# Patient Record
Sex: FEMALE | Race: WHITE | Employment: FULL TIME | URBAN - METROPOLITAN AREA
[De-identification: names, ages, dates, MRNs, and addresses within clinical notes are randomized per-mention and may not be internally consistent; named-entity substitution may affect disease eponyms.]

---

## 2019-03-19 ENCOUNTER — TELEPHONE (OUTPATIENT)
Dept: RHEUMATOLOGY | Facility: CLINIC | Age: 59
End: 2019-03-19

## 2019-03-19 ENCOUNTER — OFFICE VISIT (OUTPATIENT)
Dept: RHEUMATOLOGY | Facility: CLINIC | Age: 59
End: 2019-03-19
Payer: COMMERCIAL

## 2019-03-19 VITALS
SYSTOLIC BLOOD PRESSURE: 123 MMHG | BODY MASS INDEX: 21.05 KG/M2 | HEIGHT: 66 IN | HEART RATE: 72 BPM | WEIGHT: 131 LBS | DIASTOLIC BLOOD PRESSURE: 86 MMHG

## 2019-03-19 DIAGNOSIS — M25.50 POLYARTHRALGIA: Primary | ICD-10-CM

## 2019-03-19 DIAGNOSIS — M81.8 OTHER OSTEOPOROSIS, UNSPECIFIED PATHOLOGICAL FRACTURE PRESENCE: ICD-10-CM

## 2019-03-19 DIAGNOSIS — M79.7 FIBROMYALGIA: ICD-10-CM

## 2019-03-19 DIAGNOSIS — M54.50 LOW BACK PAIN WITHOUT SCIATICA, UNSPECIFIED BACK PAIN LATERALITY, UNSPECIFIED CHRONICITY: ICD-10-CM

## 2019-03-19 DIAGNOSIS — E55.9 VITAMIN D DEFICIENCY: ICD-10-CM

## 2019-03-19 PROCEDURE — 99212 OFFICE O/P EST SF 10 MIN: CPT | Performed by: INTERNAL MEDICINE

## 2019-03-19 PROCEDURE — 99244 OFF/OP CNSLTJ NEW/EST MOD 40: CPT | Performed by: INTERNAL MEDICINE

## 2019-03-19 RX ORDER — OMEPRAZOLE 20 MG/1
40 CAPSULE, DELAYED RELEASE ORAL ONCE
Refills: 0 | COMMUNITY
Start: 2019-02-11

## 2019-03-19 RX ORDER — ERGOCALCIFEROL (VITAMIN D2) 1250 MCG
50000 CAPSULE ORAL WEEKLY
Qty: 12 CAPSULE | Refills: 0 | Status: SHIPPED | OUTPATIENT
Start: 2019-03-19 | End: 2019-04-18

## 2019-03-19 NOTE — TELEPHONE ENCOUNTER
Pharmacy requesting 90 day refill for following. Original qty- 12  Qty requested- 13      Current Outpatient Medications:              ergocalciferol 02902 units Oral Cap Take 1 capsule (50,000 Units total) by mouth once a week.  Disp: 12 capsule Rfl: 0

## 2019-03-19 NOTE — PROGRESS NOTES
Everett Basilio is a 60year old female who presents for Patient presents with:  Joint Pain: hand,foot and back pain  Muscle Pain  . HPI:     I had the pleasure of seeing Everett Basilio on 3/19/2019 for evaluation. She was referred by Dr. Clark Rodriguez.        She is 10 years ago - after falling on it. She broke her right  arm in the [de-identified]. Running up the stairs and hitting it on a wall. Getting triggger fingers   She gets neck, shoulder and lower back pain. She can't sleep bc of back pain.      She has never gone no temple pain  Eyes: No visual changes,   CVS: No chest pain, no heart disease  RS: No SOB, no Cough, No Pleurtic pain,   GI: No nausea, no vomiiting, no abominal pain,  hx of ulcer,  Gastritis, , no dyshpagia, no BRBPR or melena  : no dysuria, no hx of %      0 - 3 %      Neutrophils Absolute      1.4 - 7.0 x10E3/uL      Lymphocytes Absolute      0.7 - 3.1 x10E3/uL      Monocytes Absolute      0.1 - 0.9 x10E3/uL      Eosinophils Absolute      0.0 - 0.4 x10E3/uL      Basophils Absolute      0.0 - 0.2 x10E 10 units/mL 7 2    SEDIMENTATION RATE-WESTERGREN      0 - 40 mm/hr      C-REACTIVE PROTEIN, QUANT      0.0 - 4.9 mg/L      VITAMIN D, 25-HYDROXY      30.0 - 100.0 ng/mL      TSH      0.450 - 4.500 uIU/mL      CCP ANTIBODIES IGG/IGA      0 - 19 units      A Globulin, Total      2.0 - 4.5 g/dL 3.4   A/G Ratio      0.7 - 2.0 1.3   Immunofixation Result, Serum       Comment   PLEASE NOTE       Comment   Anti-ssDNA      0 - 99 units/mL    Anti-dsDNA      0 - 40 IU/mL    Anti-Sm      0 - 89 units/mL    Anti-SmRN mild  reversal of the normal lordotic contour of the cervical spine, centered at C4-C5. No evidence of  acute fracture or prevertebral soft tissue swelling. There is minimal anterolisthesis of C4 on C5,  measuring 1 to 2 mm.  There is moderate narrowing of osteopenic. There is no evidence of acute fracture,  dislocation or subluxation. Hallux valgus is noted, with bunion deformity. There is mild asymmetric  narrowing of the first MTP joint, with mild degenerative osteophyte formation.  No definite osseous  er

## 2019-03-19 NOTE — PATIENT INSTRUCTIONS
1. Check labs  2. Check xrays of hands and lower back   3. Try dicllofenac gel 1 % topical   4. Return to clinic in 2 -3 weeks. 5. Check DEXA for osteoporosis   6.  Try tylenol extra strength  3000mg right now - - or arthritis - 650mg   maxium is 4000mg a

## 2019-03-20 NOTE — TELEPHONE ENCOUNTER
12 capsules (Take 1 capsule (50,000 Units total) by mouth once a week) is a 90 day or 3 month supply, no further action required.

## 2019-04-05 ENCOUNTER — TELEPHONE (OUTPATIENT)
Dept: RHEUMATOLOGY | Facility: CLINIC | Age: 59
End: 2019-04-05

## 2019-04-15 ENCOUNTER — HOSPITAL (OUTPATIENT)
Dept: OTHER | Age: 59
End: 2019-04-15

## 2019-04-15 ENCOUNTER — HOSPITAL (OUTPATIENT)
Dept: OTHER | Age: 59
End: 2019-04-15
Attending: HOSPITALIST

## 2019-04-15 LAB
ANALYZER ANC (IANC): ABNORMAL
ANION GAP SERPL CALC-SCNC: 16 MMOL/L (ref 10–20)
BASOPHILS # BLD: 0.1 THOUSAND/MCL (ref 0–0.3)
BASOPHILS NFR BLD: 0 %
BUN SERPL-MCNC: 12 MG/DL (ref 6–20)
BUN/CREAT SERPL: 26 (ref 7–25)
CALCIUM SERPL-MCNC: 8.7 MG/DL (ref 8.4–10.2)
CHLORIDE: 99 MMOL/L (ref 98–107)
CO2 SERPL-SCNC: 24 MMOL/L (ref 21–32)
CREAT SERPL-MCNC: 0.46 MG/DL (ref 0.51–0.95)
DIFFERENTIAL METHOD BLD: ABNORMAL
EOSINOPHIL # BLD: 0 THOUSAND/MCL (ref 0.1–0.5)
EOSINOPHIL NFR BLD: 0 %
ERYTHROCYTE [DISTWIDTH] IN BLOOD: 11.9 % (ref 11–15)
GLUCOSE SERPL-MCNC: 100 MG/DL (ref 65–99)
HEMATOCRIT: 38.7 % (ref 36–46.5)
HGB BLD-MCNC: 12.8 GM/DL (ref 12–15.5)
IMM GRANULOCYTES # BLD AUTO: 0 THOUSAND/MCL (ref 0–0.2)
IMM GRANULOCYTES NFR BLD: 0 %
LYMPHOCYTES # BLD: 1.7 THOUSAND/MCL (ref 1–4)
LYMPHOCYTES NFR BLD: 13 %
MCH RBC QN AUTO: 31.3 PG (ref 26–34)
MCHC RBC AUTO-ENTMCNC: 33.1 GM/DL (ref 32–36.5)
MCV RBC AUTO: 94.6 FL (ref 78–100)
MONOCYTES # BLD: 0.4 THOUSAND/MCL (ref 0.3–0.9)
MONOCYTES NFR BLD: 3 %
NEUTROPHILS # BLD: 10.7 THOUSAND/MCL (ref 1.8–7.7)
NEUTROPHILS NFR BLD: 84 %
NEUTS SEG NFR BLD: ABNORMAL %
NRBC (NRBCRE): 0 /100 WBC
PLATELET # BLD: 566 THOUSAND/MCL (ref 140–450)
POTASSIUM SERPL-SCNC: 4 MMOL/L (ref 3.4–5.1)
RBC # BLD: 4.09 MILLION/MCL (ref 4–5.2)
SODIUM SERPL-SCNC: 135 MMOL/L (ref 135–145)
WBC # BLD: 12.8 THOUSAND/MCL (ref 4.2–11)

## 2019-04-17 ENCOUNTER — DIAGNOSTIC TRANS (OUTPATIENT)
Dept: OTHER | Age: 59
End: 2019-04-17

## 2019-04-17 ENCOUNTER — HOSPITAL (OUTPATIENT)
Dept: OTHER | Age: 59
End: 2019-04-17

## 2019-04-18 LAB
ANALYZER ANC (IANC): ABNORMAL
ANION GAP SERPL CALC-SCNC: 15 MMOL/L (ref 10–20)
BASOPHILS # BLD: 0 THOUSAND/MCL (ref 0–0.3)
BASOPHILS NFR BLD: 0 %
BUN SERPL-MCNC: 12 MG/DL (ref 6–20)
BUN/CREAT SERPL: 26 (ref 7–25)
CALCIUM SERPL-MCNC: 8.7 MG/DL (ref 8.4–10.2)
CHLORIDE: 101 MMOL/L (ref 98–107)
CO2 SERPL-SCNC: 24 MMOL/L (ref 21–32)
CREAT SERPL-MCNC: 0.47 MG/DL (ref 0.51–0.95)
DIFFERENTIAL METHOD BLD: ABNORMAL
EOSINOPHIL # BLD: 0 THOUSAND/MCL (ref 0.1–0.5)
EOSINOPHIL NFR BLD: 0 %
ERYTHROCYTE [DISTWIDTH] IN BLOOD: 11.8 % (ref 11–15)
GLUCOSE SERPL-MCNC: 115 MG/DL (ref 65–99)
HEMATOCRIT: 36.6 % (ref 36–46.5)
HGB BLD-MCNC: 12.1 GM/DL (ref 12–15.5)
IMM GRANULOCYTES # BLD AUTO: 0 THOUSAND/MCL (ref 0–0.2)
IMM GRANULOCYTES NFR BLD: 0 %
LYMPHOCYTES # BLD: 0.7 THOUSAND/MCL (ref 1–4)
LYMPHOCYTES NFR BLD: 10 %
MCH RBC QN AUTO: 31.2 PG (ref 26–34)
MCHC RBC AUTO-ENTMCNC: 33.1 GM/DL (ref 32–36.5)
MCV RBC AUTO: 94.3 FL (ref 78–100)
MONOCYTES # BLD: 0.3 THOUSAND/MCL (ref 0.3–0.9)
MONOCYTES NFR BLD: 4 %
NEUTROPHILS # BLD: 6.1 THOUSAND/MCL (ref 1.8–7.7)
NEUTROPHILS NFR BLD: 86 %
NEUTS SEG NFR BLD: ABNORMAL %
NRBC (NRBCRE): 0 /100 WBC
PLATELET # BLD: 475 THOUSAND/MCL (ref 140–450)
POTASSIUM SERPL-SCNC: 3.6 MMOL/L (ref 3.4–5.1)
RBC # BLD: 3.88 MILLION/MCL (ref 4–5.2)
SODIUM SERPL-SCNC: 136 MMOL/L (ref 135–145)
WBC # BLD: 7.1 THOUSAND/MCL (ref 4.2–11)

## 2019-05-16 PROBLEM — S82.002A LEFT PATELLA FRACTURE: Status: ACTIVE | Noted: 2019-05-16

## 2019-06-05 ENCOUNTER — TELEPHONE (OUTPATIENT)
Dept: RHEUMATOLOGY | Facility: CLINIC | Age: 59
End: 2019-06-05

## 2019-06-05 DIAGNOSIS — E55.9 VITAMIN D DEFICIENCY: Primary | ICD-10-CM

## 2019-06-05 RX ORDER — MULTIVIT-MIN/IRON/FOLIC ACID/K 18-600-40
2000 CAPSULE ORAL DAILY
Qty: 30 CAPSULE | Refills: 3 | Status: SHIPPED | OUTPATIENT
Start: 2019-06-05 | End: 2019-07-05

## 2019-06-05 NOTE — TELEPHONE ENCOUNTER
LOV:3-19  Last Filled:3-19, #12 with 0 refill  Labs:4-4, Vitamin D 17  Future Appointments   Date Time Provider Janice Cross   6/7/2019  8:00 AM Velma Zapien MD [facility]   6/27/2019 10:00 AM Rowan Magallanes PA-C [facility] [address] [facility]

## 2019-06-05 NOTE — TELEPHONE ENCOUNTER
Sent in cholecalciferol 2000units daily - she should repeat her vit d in 3 months - will put order in for quest - please mail her the order

## 2019-06-10 ENCOUNTER — TELEPHONE (OUTPATIENT)
Dept: RHEUMATOLOGY | Facility: CLINIC | Age: 59
End: 2019-06-10

## 2019-06-10 NOTE — TELEPHONE ENCOUNTER
Received a refill request for Vitamin D 50,000 IU . On 6-5, 2000 IU was sent. Phoned Kristan and spoke to [facility] disregard request he received the new order for 2000 units.

## 2019-06-21 PROBLEM — S82.042D CLOSED DISPLACED COMMINUTED FRACTURE OF LEFT PATELLA WITH ROUTINE HEALING, SUBSEQUENT ENCOUNTER: Status: ACTIVE | Noted: 2019-06-21

## 2019-07-18 ENCOUNTER — OFFICE VISIT (OUTPATIENT)
Dept: RHEUMATOLOGY | Facility: CLINIC | Age: 59
End: 2019-07-18
Payer: COMMERCIAL

## 2019-07-18 ENCOUNTER — HOSPITAL ENCOUNTER (OUTPATIENT)
Dept: GENERAL RADIOLOGY | Age: 59
Discharge: HOME | End: 2019-07-18
Attending: INTERNAL MEDICINE
Payer: COMMERCIAL

## 2019-07-18 VITALS
BODY MASS INDEX: 21.21 KG/M2 | WEIGHT: 132 LBS | HEIGHT: 66 IN | DIASTOLIC BLOOD PRESSURE: 77 MMHG | HEART RATE: 77 BPM | SYSTOLIC BLOOD PRESSURE: 133 MMHG

## 2019-07-18 DIAGNOSIS — M25.571 ACUTE RIGHT ANKLE PAIN: ICD-10-CM

## 2019-07-18 DIAGNOSIS — M25.571 ACUTE RIGHT ANKLE PAIN: Primary | ICD-10-CM

## 2019-07-18 PROCEDURE — 73610 X-RAY EXAM OF ANKLE: CPT | Performed by: INTERNAL MEDICINE

## 2019-07-18 PROCEDURE — 99214 OFFICE O/P EST MOD 30 MIN: CPT | Performed by: INTERNAL MEDICINE

## 2019-07-18 RX ORDER — NAPROXEN SODIUM 220 MG
TABLET ORAL
COMMUNITY
End: 2019-12-04

## 2019-07-18 NOTE — PATIENT INSTRUCTIONS
1. Check labs  2. Check xray of right ankle . 3. Try dicllofenac gel 1 % topical   4. Get  DEXA for osteoporosis   5. Return to clinic in 2 weeks.

## 2019-07-18 NOTE — PROGRESS NOTES
Aris Mendes is a 60year old female who presents for Patient presents with: Ankle Pain: right  . HPI:     I had the pleasure of seeing Aris Mendes on 3/19/2019 for evaluation. She was referred by Dr. Carly Sanchez.        She is a pleasant 60year old who has join She broke her right  arm in the [de-identified]. Running up the stairs and hitting it on a wall. Getting triggger fingers   She gets neck, shoulder and lower back pain. She can't sleep bc of back pain. She has never gone to phsyical therpay.  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       REVIEW OF SYSTEMS:   Review Of Systems:  Fatigue  Constitutional:No fever, no change in weight tendneress   No 1-5th mtps tendneress   No tophi noted.    EXTREMITIES: no cyanosis, clubbing or edema  NEURO: intact touch, 5/5 ue and le strength    Component      Latest Ref Rng & Units 2/3/2014 2/3/2014 2/3/2014          12:27 PM 12:27 PM 12:27 PM   WBC Anti-Chromatin      0 - 99 units/mL   0   ALBUMIN, SERUM      3.5 - 5.5 g/dL      Total Bilirubin      0.0 - 1.2 mg/dL      Bilirubin, Direct      0.00 - 0.40 mg/dL      Alkaline Phosphatase      39 - 117 IU/L      AST (SGOT)      0 - 40 IU/L      ALT (S x10E3/uL 2.6   Monocytes Absolute      0.1 - 0.9 x10E3/uL 0.3   Eosinophils Absolute      0.0 - 0.4 x10E3/uL 0.1   Basophils Absolute      0.0 - 0.2 x10E3/uL 0.1   IMMATURE GRANULOCYTES      0 - 2 % 0   IMMATURE GRANS (ABS)      0.0 - 0.1 x10E3/uL 0.0   Im PROTEIN, QUANT      0.0 - 4.9 mg/L 2.0   VITAMIN D, 25-HYDROXY      30.0 - 100.0 ng/mL 14.4 (L)   TSH      0.450 - 4.500 uIU/mL 1.560   CCP ANTIBODIES IGG/IGA      0 - 19 units 2   ANTINUCLEAR ANTIBODIES(BERYL)       Negative   HLA-B27       Negative   Creat Magnesium, Serum      1.5 - 2.5 mg/dL 2.3   CREATINE KINASE, TOTAL      29 - 143 U/L 58   VITAMIN D, 25-OH, TOTAL      30 - 100 ng/mL 17 (L)     2/3/2014 - c spine xray, b/l hand xray and b/l foot xray   Cervical spine: The cervical spine is visualized t is no evidence of a displaced fracture. The bilateral hip joint spaces are preserved, and no  erosive or proliferative arthritic changes are seen at either hip. Limited evaluation of the  bilateral sacroiliac joints demonstrates no gross abnormality.      R AND PLAN:   Bernadine Valdes is a 60year old female who presents for Patient presents with: Ankle Pain: right      1.  New onset acute  right ankle pain and swleling <2 weeks - with hx of  osteoarthirits and fibrmoyalgia   - work up in 2014 was negative for in

## 2019-07-20 LAB
ANA SCREEN, IFA: POSITIVE
C-REACTIVE PROTEIN: 10 MG/L
CYCLIC CITRULLINATED$PEPTIDE (CCP) AB (IGG): <16 UNITS
RHEUMATOID FACTOR: <14 IU/ML
SED RATE BY MODIFIED$WESTERGREN: 33 MM/H
URIC ACID: 4.2 MG/DL (ref 2.5–7)

## 2019-07-22 ENCOUNTER — TELEPHONE (OUTPATIENT)
Dept: RHEUMATOLOGY | Facility: CLINIC | Age: 59
End: 2019-07-22

## 2019-07-22 RX ORDER — MELOXICAM 7.5 MG/1
7.5 TABLET ORAL DAILY
Qty: 30 TABLET | Refills: 0 | Status: SHIPPED | OUTPATIENT
Start: 2019-07-22 | End: 2020-11-09

## 2019-07-22 NOTE — TELEPHONE ENCOUNTER
Pt reports worsening R ankle pain and swelling. Pt rates pain at 8/10. She is having difficulty walking and experiences numbness/tingling to 2nd and 3rd toes w/ walking. Pt reports toes are pink in color and she is able to wiggle them.      Pt has tried [employer]

## 2019-07-22 NOTE — TELEPHONE ENCOUNTER
Does she want to try a stronger anti inflammatory than aleve - like meloxicam?  - she can take it with omeprazole to prevent gastririts

## 2019-07-22 NOTE — TELEPHONE ENCOUNTER
Contacted pt and reviewed Dr. Cleve Klein message below. Pt would like to try meloxicam. 30-day script for meloxicam 7.5mg/day pended if agreeable. Please advise.

## 2019-08-02 ENCOUNTER — HOSPITAL ENCOUNTER (OUTPATIENT)
Dept: BONE DENSITY | Facility: HOSPITAL | Age: 59
Discharge: HOME | End: 2019-08-02
Attending: INTERNAL MEDICINE
Payer: COMMERCIAL

## 2019-08-02 DIAGNOSIS — M81.8 OTHER OSTEOPOROSIS, UNSPECIFIED PATHOLOGICAL FRACTURE PRESENCE: ICD-10-CM

## 2019-08-02 PROCEDURE — 77080 DXA BONE DENSITY AXIAL: CPT | Performed by: INTERNAL MEDICINE

## 2019-08-06 ENCOUNTER — OFFICE VISIT (OUTPATIENT)
Dept: RHEUMATOLOGY | Facility: CLINIC | Age: 59
End: 2019-08-06
Payer: COMMERCIAL

## 2019-08-06 ENCOUNTER — TELEPHONE (OUTPATIENT)
Dept: RHEUMATOLOGY | Facility: CLINIC | Age: 59
End: 2019-08-06

## 2019-08-06 VITALS
SYSTOLIC BLOOD PRESSURE: 105 MMHG | WEIGHT: 132 LBS | BODY MASS INDEX: 21.21 KG/M2 | HEART RATE: 77 BPM | DIASTOLIC BLOOD PRESSURE: 69 MMHG | HEIGHT: 66 IN

## 2019-08-06 DIAGNOSIS — M81.0 OSTEOPOROSIS, UNSPECIFIED OSTEOPOROSIS TYPE, UNSPECIFIED PATHOLOGICAL FRACTURE PRESENCE: Primary | ICD-10-CM

## 2019-08-06 DIAGNOSIS — M25.50 POLYARTHRALGIA: ICD-10-CM

## 2019-08-06 PROCEDURE — 99214 OFFICE O/P EST MOD 30 MIN: CPT | Performed by: INTERNAL MEDICINE

## 2019-08-06 NOTE — PATIENT INSTRUCTIONS
1. Try prolia -   2. Check labs prior to prolia -   3. Return to clinic in 12 months. - will repeat DEXA at that time. Denosumab injection  Brand Names: Kirill Cooper  What is this medicine? DENOSUMAB (den oh shayy mab) slows bone breakdown.  Prolia is use effects that usually do not require medical attention (report to your doctor or health care professional if they continue or are bothersome):  · constipation  · diarrhea  · headache  · joint pain  · loss of appetite  · muscle pain  · runny nose  · tirednes enough calcium and vitamin D while you are taking this medicine, unless your doctor tells you not to. Discuss the foods you eat and the vitamins you take with your health care professional.  See your dentist regularly.  Brush and floss your teeth as directe

## 2019-08-06 NOTE — PROGRESS NOTES
Tanna Coleman is a 60year old female who presents for Patient presents with: Follow - Up: Labs,xray, dexa discussion  . HPI:     I had the pleasure of seeing Tanna Coleman on 3/19/2019 for evaluation. She was referred by Dr. Kathia Borrego.        She is a pleasan ago - after falling on it. She broke her right  arm in the [de-identified]. Running up the stairs and hitting it on a wall. Getting triggger fingers   She gets neck, shoulder and lower back pain. She can't sleep bc of back pain.      [facility]        Family History   Problem Relation Age of Onset   • Heart Disorder Father    • Cancer Father         prostate   • Other (Other) Mother         RA    1 brother - prostate cancer passed 3 years ago  -    Social History:  Social History and bouchards notdes in right 2-5th pips -   Squaring of thumbs and wrists   B/l thumb tender in dips -   No synvoitsi seen   Tender in elbows, and shoulders   tneder in neck   Tender in low back - SLR slightly positive onn left side   Tender in 1st mtps - 0.7 - 2.0      Immunofixation Result, Serum            PLEASE NOTE            Anti-ssDNA      0 - 99 units/mL   30   Anti-dsDNA      0 - 40 IU/mL   2   Anti-Sm      0 - 89 units/mL   5   Anti-SmRNP      0 - 83 units/mL   18   Anti-SS-A (Ro)      0 - 91 uni 93   MCH      26.6 - 33.0 pg 31.3   MCHC      31.5 - 35.7 g/dL 33.6   RDW      12.3 - 15.4 % 12.9   Platelet Count      709 - 379 x10E3/uL 355   Neutrophils %      40 - 74 % 55   Lymphocytes %      14 - 46 % 37   Monocytes %      4 - 12 % 5   Eosinophils % AM      >59 mL/min/1.73 118   HEP A AB, IGM      Negative Negative   HBSAg Screen      Negative Negative   HEP B CORE AB, IGM      Negative Negative   HEP C VIRUS AB      0.0 - 0.9 s/co ratio 0.1   RF IgM      0 - 25 IU/mL    RF IgG      0 - 20 units/mL Hematocrit      35.0 - 45.0 % 36.6   MCV      80.0 - 100.0 fL 92.7   MCH      27.0 - 33.0 pg 31.1   MCHC      32.0 - 36.0 g/dL 33.6   RDW      11.0 - 15.0 % 11.7   Platelet Count      125 - 400 Thousand/uL 484 (H)   MPV      7.5 - 12.5 fL 9.6   C-REACTIV joints addition, there  are central erosions at the second and fifth DIP joints. The MCP joints and wrist joints are  relatively preserved. A nonspecific subcortical cyst is seen within the lunate.      Left hand: Bony alignment is anatomic, and there is no joints. No evidence of arthritic changes in the pelvis/hips. Mild degenerative arthritis at the first MTP joints of the bilateral feet, with bilateral hallux  valgus/bunion deformities.     7/12/2019 - right wrist xray    X-rays of the right wrist d cymbalta,   She tried gabpnetin, flexeril, tylenol #3 and tramadol in the past.   - diclofeanc gel 1 %  On aleve - - no gastirits and not helping.        2. osteoporosis - check DEXA scan - she is due - communited fracture of right wrist in 4/2019 - and lef

## 2019-08-08 ENCOUNTER — TELEPHONE (OUTPATIENT)
Dept: RHEUMATOLOGY | Facility: CLINIC | Age: 59
End: 2019-08-08

## 2019-08-08 NOTE — TELEPHONE ENCOUNTER
No PA required for Prolia injection. The provider is in network for this pt's plan. Wheter office visit is billed or not, the patient will be responsible for a $30 co-pay which will cover Prolia, administration, and the office visit.  Co-pays contribute to a

## 2019-08-08 NOTE — TELEPHONE ENCOUNTER
Pt aware of BI information. Pt agreeable and scheduled appointment for 8/14 at 9 am [facility]. She is aware to have labs completed 1-2 days prior to Prolia injection.

## 2019-08-08 NOTE — TELEPHONE ENCOUNTER
Per pt she would like her Order fax to [employer] in [facility] SDR#[phone] Fax# [phone]. Pt is planning to go on 08/12/2019 Monday.

## 2019-08-12 LAB
ALBUMIN/GLOBULIN RATIO: 1.5 (CALC) (ref 1–2.5)
ALBUMIN: 4.3 G/DL (ref 3.6–5.1)
ALKALINE PHOSPHATASE: 106 U/L (ref 33–130)
ALT: 9 U/L (ref 6–29)
AST: 14 U/L (ref 10–35)
BILIRUBIN, TOTAL: 0.4 MG/DL (ref 0.2–1.2)
BUN: 7 MG/DL (ref 7–25)
CALCIUM: 9.8 MG/DL (ref 8.6–10.4)
CARBON DIOXIDE: 27 MMOL/L (ref 20–32)
CHLORIDE: 102 MMOL/L (ref 98–110)
CREATININE: 0.62 MG/DL (ref 0.5–0.99)
EGFR IF AFRICN AM: 114 ML/MIN/1.73M2
EGFR IF NONAFRICN AM: 98 ML/MIN/1.73M2
GLOBULIN: 2.8 G/DL (CALC) (ref 1.9–3.7)
GLUCOSE: 90 MG/DL (ref 65–99)
POTASSIUM: 4.7 MMOL/L (ref 3.5–5.3)
PROTEIN, TOTAL: 7.1 G/DL (ref 6.1–8.1)
SODIUM: 137 MMOL/L (ref 135–146)

## 2019-08-13 LAB — SED RATE BY MODIFIED$WESTERGREN: 28 MM/H

## 2019-08-28 ENCOUNTER — NURSE ONLY (OUTPATIENT)
Dept: RHEUMATOLOGY | Facility: CLINIC | Age: 59
End: 2019-08-28
Payer: COMMERCIAL

## 2019-08-28 DIAGNOSIS — M81.0 POSTMENOPAUSAL OSTEOPOROSIS: Primary | ICD-10-CM

## 2019-08-28 PROCEDURE — 96372 THER/PROPH/DIAG INJ SC/IM: CPT | Performed by: INTERNAL MEDICINE

## 2019-08-28 NOTE — PROGRESS NOTES
Patient came to office for first Prolia injection. Labs reviewed and WNL. Name and  verified. Patient aware she is to receive Prolia injection. Injection given to left upper arm subcutaneously, patient tolerated injection well.  Patient given reminder no

## 2019-12-04 ENCOUNTER — OFFICE VISIT (OUTPATIENT)
Dept: RHEUMATOLOGY | Facility: CLINIC | Age: 59
End: 2019-12-04
Payer: COMMERCIAL

## 2019-12-04 VITALS
BODY MASS INDEX: 21.21 KG/M2 | WEIGHT: 132 LBS | RESPIRATION RATE: 16 BRPM | HEART RATE: 72 BPM | SYSTOLIC BLOOD PRESSURE: 110 MMHG | HEIGHT: 66 IN | DIASTOLIC BLOOD PRESSURE: 73 MMHG

## 2019-12-04 DIAGNOSIS — M81.0 OSTEOPOROSIS, UNSPECIFIED OSTEOPOROSIS TYPE, UNSPECIFIED PATHOLOGICAL FRACTURE PRESENCE: ICD-10-CM

## 2019-12-04 DIAGNOSIS — G89.29 OTHER CHRONIC PAIN: ICD-10-CM

## 2019-12-04 DIAGNOSIS — M15.9 GENERALIZED OSTEOARTHRITIS: Primary | ICD-10-CM

## 2019-12-04 PROCEDURE — 99213 OFFICE O/P EST LOW 20 MIN: CPT | Performed by: INTERNAL MEDICINE

## 2019-12-04 NOTE — PROGRESS NOTES
Taj Johnson is a 60year old female who presents for Patient presents with:  Osteoporosis  Fibromyalgia  Medication Follow-Up  Osteoarthritis: pt c/o left shoulder pain  . HPI:     I had the pleasure of seeing Taj Johnson on 3/19/2019 for evaluation. recently. She broke her right wrist about 10 years ago - after falling on it. She broke her right  arm in the [de-identified]. Running up the stairs and hitting it on a wall. Getting triggger fingers   She gets neck, shoulder and lower back pain.    She can't tablet (7.5 mg total) by mouth daily. 30 tablet 0   • Diclofenac Sodium 1 % Transdermal Gel Apply 2 g topically 4 (four) times daily. 1 Tube 3   • omeprazole 20 MG Oral Capsule Delayed Release 2 (two) times daily.   0   • Acetaminophen (TYLENOL OR) 500 mg a (59.9 kg)   BMI 21.31 kg/m²   HEENT: Clear oropharynx, no oral ulcers, EOM intact, clear sclear, PERRLA, pleasant, no acute distress, no CAD, no neck tendnerness, good ROM,   No rashes  CVS: RRR, no murmurs  RS: CTAB, no crackles, no rhonchi  ABD: Soft Non Immunoglobulin A, Qn, Serum      91 - 414 mg/dL      Immunoglobulin M, Qn, Serum      40 - 230 mg/dL      Total Protein      6.0 - 8.5 g/dL      Albumin      3.2 - 5.6 g/dL      ALPHA-1-GLOBULIN      0.1 - 0.4 g/dL      ALPHA-2-GLOBULIN      0.4 - 1.2 Adult      Complement C4, Serum      9 - 36 mg/dL Adult      ACE      14 - 82 U/L      PTH, INTACT      15 - 65 pg/mL      CALCIUM      8.7 - 10.2 mg/dL      G-6-PD, QUANT      146 - 376 U/10E12 RBC        Component      Latest Ref Rng & Units 2/3/2014 Anti-Scl-70      0 - 32 units/mL    Anti-Chromatin      0 - 99 units/mL    ALBUMIN, SERUM      3.5 - 5.5 g/dL 4.6   Total Bilirubin      0.0 - 1.2 mg/dL 0.4   Bilirubin, Direct      0.00 - 0.40 mg/dL 0.10   Alkaline Phosphatase      39 - 117 IU/L 84   AS 7.0   Albumin      3.6 - 5.1 g/dL 4.1   Globulin, Total      1.9 - 3.7 g/dL (calc) 2.9   ALBUMIN/GLOBULIN RATIO      1.0 - 2.5 (calc) 1.4   Total Bilirubin      0.2 - 1.2 mg/dL 0.4   Alkaline Phosphatase      33 - 130 U/L 107   AST      10 - 35 U/L 17   AL each of these levels as well. No bridging syndesmophytes are identified. On oblique  views, there is no bony foraminal stenosis at any cervical level.      Right hand: Bony alignment is anatomic, and there is no evidence of acute fracture, dislocation or  s with bunion deformity. There is mild asymmetric narrowing of  the first MTP joint, with mild degenerative osteophyte formation.  No definite osseous erosions are  identified.      =====  IMPRESSION:     Degenerative changes at multiple levels of the cervica Osteoarthritis  OP  L shoulder pain  BLE pain    - Blood work in the past showed elevated ESR and CRP but it is now normal.  Also blood work for inflammatory arthritis was negative  - Evidence of Valerie's and Heberden nodes consistent with OA  - Cannot t

## 2019-12-04 NOTE — PATIENT INSTRUCTIONS
You were seen today for ostoearthritis  Cont tylneol as needed  Can recommend pain management if needed  Consider getting prolia in feb  See pain management

## 2020-02-13 ENCOUNTER — TELEPHONE (OUTPATIENT)
Dept: RHEUMATOLOGY | Facility: CLINIC | Age: 60
End: 2020-02-13

## 2020-02-13 DIAGNOSIS — M81.0 POSTMENOPAUSAL OSTEOPOROSIS: Primary | ICD-10-CM

## 2020-02-13 NOTE — TELEPHONE ENCOUNTER
Pt is due for Prolia after 2/28/2020. BI started on [employer] portal.     \"An Instant Benefit Verification could not be completed. Your request has been sent for processing, which can take 2-5 business days. \"

## 2020-02-24 NOTE — TELEPHONE ENCOUNTER
Benefits summary:  - No PA required  - BCBS coverage Details: The provider is in network for this patient's plan. Prolia is subject to a $30 co-pay.  Admin and office visit, if billed, are subject to a $1500 deductible ($0 met), 10% co-insurance, up to a $3

## 2020-02-28 LAB
ALBUMIN/GLOBULIN RATIO: 1.4 (CALC) (ref 1–2.5)
ALBUMIN: 4.2 G/DL (ref 3.6–5.1)
ALKALINE PHOSPHATASE: 72 U/L (ref 37–153)
ALT: 9 U/L (ref 6–29)
AST: 16 U/L (ref 10–35)
BILIRUBIN, TOTAL: 0.5 MG/DL (ref 0.2–1.2)
BUN: 12 MG/DL (ref 7–25)
CALCIUM: 9.5 MG/DL (ref 8.6–10.4)
CARBON DIOXIDE: 28 MMOL/L (ref 20–32)
CHLORIDE: 102 MMOL/L (ref 98–110)
CREATININE: 0.57 MG/DL (ref 0.5–0.99)
EGFR IF AFRICN AM: 117 ML/MIN/1.73M2
EGFR IF NONAFRICN AM: 101 ML/MIN/1.73M2
GLOBULIN: 3.1 G/DL (CALC) (ref 1.9–3.7)
GLUCOSE: 77 MG/DL (ref 65–99)
POTASSIUM: 4.4 MMOL/L (ref 3.5–5.3)
PROTEIN, TOTAL: 7.3 G/DL (ref 6.1–8.1)
SODIUM: 137 MMOL/L (ref 135–146)

## 2020-03-04 ENCOUNTER — NURSE ONLY (OUTPATIENT)
Dept: RHEUMATOLOGY | Facility: CLINIC | Age: 60
End: 2020-03-04
Payer: COMMERCIAL

## 2020-03-04 DIAGNOSIS — M15.9 GENERALIZED OSTEOARTHRITIS: Primary | ICD-10-CM

## 2020-03-04 PROCEDURE — 96372 THER/PROPH/DIAG INJ SC/IM: CPT | Performed by: INTERNAL MEDICINE

## 2020-03-04 NOTE — PROGRESS NOTES
Patient came to office for 6 month follow up Prolia injection. Labs reviewed and WNL. Name and  verified. Patient aware she is to receive Prolia injection. Injection given to left upper arm, patient tolerated injection well.  Patient given reminder n

## 2020-04-22 ENCOUNTER — TELEMEDICINE (OUTPATIENT)
Dept: RHEUMATOLOGY | Facility: CLINIC | Age: 60
End: 2020-04-22
Payer: COMMERCIAL

## 2020-04-22 DIAGNOSIS — M81.0 OSTEOPOROSIS, UNSPECIFIED OSTEOPOROSIS TYPE, UNSPECIFIED PATHOLOGICAL FRACTURE PRESENCE: Primary | ICD-10-CM

## 2020-04-22 PROCEDURE — 99214 OFFICE O/P EST MOD 30 MIN: CPT | Performed by: INTERNAL MEDICINE

## 2020-04-22 RX ORDER — AMITRIPTYLINE HYDROCHLORIDE 10 MG/1
10 TABLET, FILM COATED ORAL NIGHTLY
Qty: 30 TABLET | Refills: 1 | Status: SHIPPED | OUTPATIENT
Start: 2020-04-22 | End: 2020-11-09

## 2020-04-22 NOTE — PATIENT INSTRUCTIONS
1. Cont.  prolia 60mg every 6 months -   2. Due for  DEXA at that time in 3/2021   3. Start amitriptyline 10mg at night   4. Follow up in 1-2 months. Amitriptyline tablets  Brand Names: Elavil, Vanatrip  What is this medicine?   AMITRIPTYLINE (a aly TRIP t your doctor or health care professional if they continue or are bothersome):  · change in sex drive or performance  · change in appetite or weight  · constipation  · dizziness  · dry mouth  · nausea  · tired  · tremors  · upset stomach  What may interact w attempt  · an unusual or allergic reaction to amitriptyline, other medicines, foods, dyes, or preservatives  · pregnant or trying to get pregnant  · breast-feeding  What should I watch for while using this medicine?   Tell your doctor if your symptoms do no to 3 days. If you do not have a bowel movement for 3 days, call your doctor or health care professional.  This medicine can make you more sensitive to the sun. Keep out of the sun.  If you cannot avoid being in the sun, wear protective clothing and use suns

## 2020-04-22 NOTE — PROGRESS NOTES
Ajith Lewis is a 63year old female who presents for No chief complaint on file. Markell Mayen HPI:     I had the pleasure of seeing Ajith Lewis on 3/19/2019 for evaluation. She was referred by Dr. Gold Riddle. She is a pleasant 60year old who has joint pain. broke her right  arm in the [de-identified]. Running up the stairs and hitting it on a wall. Getting triggger fingers   She gets neck, shoulder and lower back pain. She can't sleep bc of back pain. She has never gone to phsyical therpay. No recent xrays.    Kamla Marcelino having more pain. 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 But she still wants to stay on prolia til she gets her DEXA again in 3/ 2021   She is having more stomach pain - hx of ulcers. She's on omeprzole. Her right ankle pain is gone.      She HEENT: No dry eyes, no dry mouth, no Raynaud's, no nasal ulcers, no parotid swelling, no neck pain, no jaw pain, no temple pain  Eyes: No visual changes,   CVS: No chest pain, no heart disease  RS: No SOB, no Cough, No Pleurtic pain,   GI: No nausea, no Bilirubin      0.2 - 1.2 mg/dL 0.4    Alkaline Phosphatase      33 - 130 U/L 106    AST      10 - 35 U/L 14    ALT (SGPT)      6 - 29 U/L 9    BERYL PATTERN        NUCLEOLAR (A)   BERYL TITER      titer  1:40 (H)   URIC ACID      2.5 - 7.0 mg/dL  4.2   C-REACT joints and the fifth PIP joint. Joint space narrowing is seen  to a lesser degree at the thumb IP joint and third PIP joint. Central erosions are present at the  second and third DIP joints. The MCP joints and wrist joints are relatively preserved.       Pe left knee show the fracture of the tibial plateau to be healed and in excellent alignment. Patella fracture is healed. Joint spaces well maintained.        5/16/2019 - left knee xray   Two view films taken of the left knee show good alignment of the tibio patellar fracture in 4/2019 -   Hx of of being on fosamax - for 1 1/2 years in 15 years ago - she had bad gastirits - hx of ulcers   - then had h.pylori -   - cont prolia 60mg every 6 months- started in 8/2019    3.  Hx of hysterectomy -   4 vit d def - vit

## 2020-08-26 ENCOUNTER — TELEPHONE (OUTPATIENT)
Dept: RHEUMATOLOGY | Facility: CLINIC | Age: 60
End: 2020-08-26

## 2020-08-26 NOTE — TELEPHONE ENCOUNTER
Patient states she was called for Brenda Tran duty for 09/28th and it will be a 6 week trial will end arround  mid November.  Patient states in the paper she received, it says she can be excused if she lives with high risk family members or if she works in the med

## 2020-08-28 ENCOUNTER — TELEPHONE (OUTPATIENT)
Dept: RHEUMATOLOGY | Facility: CLINIC | Age: 60
End: 2020-08-28

## 2020-08-28 DIAGNOSIS — M81.0 OSTEOPOROSIS, UNSPECIFIED OSTEOPOROSIS TYPE, UNSPECIFIED PATHOLOGICAL FRACTURE PRESENCE: Primary | ICD-10-CM

## 2020-08-28 NOTE — TELEPHONE ENCOUNTER
Patient is requesting a prolia injectio. Per patient she had injection scheduled for September. No appointment found. Please advise.

## 2020-09-01 NOTE — TELEPHONE ENCOUNTER
Benefits summary: no PA required. OOP Cost: 20% administration fee 20% deductible $500 ($500 met) OOP max individual $4000 ($82.10 met). Spoke with patient about possible cost of medication and administration est out of pocket cost $200 to $300.  She wa

## 2020-09-02 NOTE — TELEPHONE ENCOUNTER
[email or web address] contacted at [phone] and no PA required per [employer] Z[facility]#[id number]. Pt aware to have BMP completed and appt scheduled for 9/14 at 1 0am CF.

## 2020-09-05 ENCOUNTER — APPOINTMENT (OUTPATIENT)
Dept: CT IMAGING | Age: 60
End: 2020-09-05
Attending: INTERNAL MEDICINE

## 2020-09-10 LAB
BUN: 9 MG/DL (ref 7–25)
CALCIUM: 9.8 MG/DL (ref 8.6–10.4)
CARBON DIOXIDE: 28 MMOL/L (ref 20–32)
CHLORIDE: 102 MMOL/L (ref 98–110)
CREATININE: 0.61 MG/DL (ref 0.5–0.99)
EGFR IF AFRICN AM: 113 ML/MIN/1.73M2
EGFR IF NONAFRICN AM: 98 ML/MIN/1.73M2
GLUCOSE: 87 MG/DL (ref 65–99)
POTASSIUM: 3.9 MMOL/L (ref 3.5–5.3)
SODIUM: 137 MMOL/L (ref 135–146)

## 2020-09-12 ENCOUNTER — APPOINTMENT (OUTPATIENT)
Dept: CT IMAGING | Age: 60
End: 2020-09-12
Attending: INTERNAL MEDICINE

## 2020-09-14 ENCOUNTER — NURSE ONLY (OUTPATIENT)
Dept: RHEUMATOLOGY | Facility: CLINIC | Age: 60
End: 2020-09-14
Payer: COMMERCIAL

## 2020-09-14 DIAGNOSIS — M81.0 OSTEOPOROSIS, UNSPECIFIED OSTEOPOROSIS TYPE, UNSPECIFIED PATHOLOGICAL FRACTURE PRESENCE: Primary | ICD-10-CM

## 2020-09-14 PROCEDURE — 96372 THER/PROPH/DIAG INJ SC/IM: CPT | Performed by: INTERNAL MEDICINE

## 2020-09-14 NOTE — PROGRESS NOTES
Patient came to office for 6 month follow up Prolia injection. Labs reviewed and WNL. Name and  verified. Patient aware she is to receive Prolia injection. Injection given to right upper arm, patient tolerated injection well.  Patient given reminder

## 2020-09-29 RX ORDER — ACETAMINOPHEN 160 MG
2000 TABLET,DISINTEGRATING ORAL DAILY
COMMUNITY
Start: 2020-05-13 | End: 2020-09-29

## 2020-09-29 RX ORDER — ACETAMINOPHEN 160 MG
2000 TABLET,DISINTEGRATING ORAL DAILY
Qty: 30 CAPSULE | Refills: 3 | Status: SHIPPED | OUTPATIENT
Start: 2020-09-29 | End: 2021-03-16

## 2020-09-29 NOTE — TELEPHONE ENCOUNTER
LOV:4/22/20   Last Refilled:#30, 3rfs  6/5/19  Labs:Vitamin D 17  4/4/19    Future Appointments   Date Time Provider Janice Cross   11/9/2020 10:20 AM Tk Licona MD 2014 [facility]   3/15/2021  9:00 AM [facility]AT[facility]

## 2020-10-02 ENCOUNTER — APPOINTMENT (OUTPATIENT)
Dept: CT IMAGING | Age: 60
End: 2020-10-02
Attending: INTERNAL MEDICINE

## 2020-10-12 ENCOUNTER — HOSPITAL ENCOUNTER (OUTPATIENT)
Dept: CT IMAGING | Age: 60
Discharge: HOME | End: 2020-10-12
Attending: INTERNAL MEDICINE

## 2020-10-12 DIAGNOSIS — R10.13 EPIGASTRIC PAIN: ICD-10-CM

## 2020-10-12 DIAGNOSIS — R14.0 BLOATING: ICD-10-CM

## 2020-10-12 DIAGNOSIS — K21.9 ESOPHAGEAL REFLUX: ICD-10-CM

## 2020-10-12 PROCEDURE — 74177 CT ABD & PELVIS W/CONTRAST: CPT

## 2020-10-12 PROCEDURE — 10002805 HB CONTRAST AGENT: Performed by: INTERNAL MEDICINE

## 2020-10-12 RX ADMIN — IOHEXOL 100 ML: 350 INJECTION, SOLUTION INTRAVENOUS at 08:15

## 2020-11-09 ENCOUNTER — TELEPHONE (OUTPATIENT)
Dept: RHEUMATOLOGY | Facility: CLINIC | Age: 60
End: 2020-11-09

## 2020-11-09 ENCOUNTER — OFFICE VISIT (OUTPATIENT)
Dept: RHEUMATOLOGY | Facility: CLINIC | Age: 60
End: 2020-11-09
Payer: COMMERCIAL

## 2020-11-09 VITALS
BODY MASS INDEX: 21.53 KG/M2 | SYSTOLIC BLOOD PRESSURE: 117 MMHG | HEART RATE: 63 BPM | DIASTOLIC BLOOD PRESSURE: 80 MMHG | HEIGHT: 66 IN | WEIGHT: 134 LBS | RESPIRATION RATE: 16 BRPM

## 2020-11-09 DIAGNOSIS — M15.9 GENERALIZED OSTEOARTHRITIS: ICD-10-CM

## 2020-11-09 DIAGNOSIS — G89.29 CHRONIC BILATERAL LOW BACK PAIN, UNSPECIFIED WHETHER SCIATICA PRESENT: ICD-10-CM

## 2020-11-09 DIAGNOSIS — M81.0 OSTEOPOROSIS, UNSPECIFIED OSTEOPOROSIS TYPE, UNSPECIFIED PATHOLOGICAL FRACTURE PRESENCE: ICD-10-CM

## 2020-11-09 DIAGNOSIS — E55.9 VITAMIN D DEFICIENCY: ICD-10-CM

## 2020-11-09 DIAGNOSIS — M54.50 CHRONIC BILATERAL LOW BACK PAIN, UNSPECIFIED WHETHER SCIATICA PRESENT: ICD-10-CM

## 2020-11-09 DIAGNOSIS — M25.50 POLYARTHRALGIA: ICD-10-CM

## 2020-11-09 PROCEDURE — 99214 OFFICE O/P EST MOD 30 MIN: CPT | Performed by: INTERNAL MEDICINE

## 2020-11-09 PROCEDURE — 3074F SYST BP LT 130 MM HG: CPT | Performed by: INTERNAL MEDICINE

## 2020-11-09 PROCEDURE — 99212 OFFICE O/P EST SF 10 MIN: CPT | Performed by: INTERNAL MEDICINE

## 2020-11-09 PROCEDURE — 3079F DIAST BP 80-89 MM HG: CPT | Performed by: INTERNAL MEDICINE

## 2020-11-09 PROCEDURE — 3008F BODY MASS INDEX DOCD: CPT | Performed by: INTERNAL MEDICINE

## 2020-11-09 NOTE — PROGRESS NOTES
Anuradha Olguin is a 63year old female who presents for Patient presents with:  Osteoporosis  Lab Results  Medication Follow-Up  Hand Pain  . HPI:     I had the pleasure of seeing Anuradha Olguin on 3/19/2019 for evaluation. She was referred by Dr. Jama Sanchez. wrist about 10 years ago - after falling on it. She broke her right  arm in the [de-identified]. Running up the stairs and hitting it on a wall. Getting triggger fingers   She gets neck, shoulder and lower back pain. She can't sleep bc of back pain.      She ha 5/10 but at times it can get to 8/10. Falling asleep is having more pain. 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 But she still wants to stay on prolia til she gets her DEXA again in 3/ 2021   She is having more stomach pain - hx of ulcers.  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       REVIEW OF SYSTEMS:   Review O mg/L 10.0 (H)   SED RATE BY MODIFIED$WESTERGREN      < OR = 30 mm/h 33 (H)   CYCLIC CITRULLINATED$PEPTIDE (CCP) AB (IGG)      UNITS <16   RHEUMATOID FACTOR      <14 IU/mL <14   BERYL SCREEN, IFA      NEGATIVE POSITIVE (A)     Component      Latest Ref Rng & project anteriorly at each of these levels, and there is uncovertebral  hypertrophy at each of these levels as well. No bridging syndesmophytes are identified. On oblique  views, there is no bony foraminal stenosis at any cervical level. Right hand:  Dickson Keen is no evidence of acute fracture, dislocation  or subluxation. Hallux valgus is noted, with bunion deformity. There is mild asymmetric narrowing of  the first MTP joint, with mild degenerative osteophyte formation.  No definite osseous erosions are  identif Results  Medication Follow-Up  Hand Pain      1.  osteoarthirits and fibrmoyalgia   Increasing pain -   - work up in 2014 was negative for inflammatory arthritis , ankle pain in 2019 resolved -   - in 3/2019 - sed rate and crp very elevated - sed rate 80mm at that time in 3/2021   3. Check si joint xray and lumbar xray   4. Given lower back exercises -   5. Will try to get MRI lumbar and MRI SI joint   6.  Check labs today       Thalia Wisdom MD  11/9/2020   10:14 AM  - Reviewed [place]- information  thro

## 2020-11-09 NOTE — PATIENT INSTRUCTIONS
1. Cont.  prolia 60mg every 6 months -   2. Due for  DEXA at that time in 3/2021   3. Check si joint xray and lumbar xray   4. Given lower back exercises -   5. Will try to get MRI lumbar and MRI SI joint   6.  Check labs today

## 2020-11-09 NOTE — TELEPHONE ENCOUNTER
Pt has ongoing back pain x 6 months - will get xrays - but will need PA for mri lumbar and si joints   - she's going to start home exercises or PT

## 2020-12-17 NOTE — TELEPHONE ENCOUNTER
Message sent to [address] [place] [address]. In [facility]; she is currently working on patient's prior authorizations.

## 2020-12-17 NOTE — TELEPHONE ENCOUNTER
Brenda Marquez from [employer] stated we have to contact [place] phone #[phone] to get a prior authorization. Pt is scheduled for an MRI 12/21. Please advise. Thank you.

## 2020-12-18 NOTE — TELEPHONE ENCOUNTER
Please see request below from [address] [place] [address]. Did patient have x-rays completed?

## 2020-12-18 NOTE — TELEPHONE ENCOUNTER
The health plan is requesting additional information for the second part of the study, [phone]. The notes provided were not sufficient enough. Prior diagnostic studies and results are needed, prior management and medications with dose and duration.  Please ad

## 2020-12-28 ENCOUNTER — HOSPITAL ENCOUNTER (OUTPATIENT)
Dept: GENERAL RADIOLOGY | Facility: HOSPITAL | Age: 60
Discharge: HOME | End: 2020-12-28
Attending: INTERNAL MEDICINE
Payer: COMMERCIAL

## 2020-12-28 DIAGNOSIS — M54.50 CHRONIC BILATERAL LOW BACK PAIN, UNSPECIFIED WHETHER SCIATICA PRESENT: ICD-10-CM

## 2020-12-28 DIAGNOSIS — G89.29 CHRONIC BILATERAL LOW BACK PAIN, UNSPECIFIED WHETHER SCIATICA PRESENT: ICD-10-CM

## 2020-12-28 PROCEDURE — 72202 X-RAY EXAM SI JOINTS 3/> VWS: CPT | Performed by: INTERNAL MEDICINE

## 2020-12-28 PROCEDURE — 72100 X-RAY EXAM L-S SPINE 2/3 VWS: CPT | Performed by: INTERNAL MEDICINE

## 2020-12-29 ENCOUNTER — TELEPHONE (OUTPATIENT)
Dept: ADMINISTRATIVE | Age: 60
End: 2020-12-29

## 2020-12-29 ENCOUNTER — TELEPHONE (OUTPATIENT)
Dept: CASE MANAGEMENT | Age: 60
End: 2020-12-29

## 2020-12-30 NOTE — TELEPHONE ENCOUNTER
Pt. Had her xrays completed - should I have her come in for a visit to re-evaluated after her home PT - or do you think it's enough to get prior auth?

## 2020-12-30 NOTE — TELEPHONE ENCOUNTER
It would be best to have pt follow up if you wan the MRI of SI joints completed to document response to home PT. MRI of lumbar spine is approved until 1/28/2021, please advise.     [id number]   [id number]  12/14/2020 through 01/28/2021

## 2021-01-05 ENCOUNTER — TELEPHONE (OUTPATIENT)
Dept: RHEUMATOLOGY | Facility: CLINIC | Age: 61
End: 2021-01-05

## 2021-01-05 DIAGNOSIS — M54.50 CHRONIC BILATERAL LOW BACK PAIN, UNSPECIFIED WHETHER SCIATICA PRESENT: Primary | ICD-10-CM

## 2021-01-05 DIAGNOSIS — G89.29 CHRONIC BILATERAL LOW BACK PAIN, UNSPECIFIED WHETHER SCIATICA PRESENT: Primary | ICD-10-CM

## 2021-01-05 NOTE — TELEPHONE ENCOUNTER
Pt contacted and advised MRI lumbar spine approved. Advised MRI SI joints not approved. Order for for lumbar spine (CPT 42977) faxed to [facility]. Pt aware it needs to be completed by 1/28.       [facility]   [id number]  [address]

## 2021-01-05 NOTE — TELEPHONE ENCOUNTER
Ok to let her know that mri lumbar spine was approved but not her si joints - ok for her to schedule for mri lumbar spine

## 2021-01-05 NOTE — TELEPHONE ENCOUNTER
Denial   MRI Pelvis     The patient's health plan DENIED [id number] but APPROVED [id number]. I have attached the denial letter for cpt code 40515. Please reach out to the health plan for a peer review. Also, contact patient with plan of care.      Thank you, Kimo

## 2021-03-15 ENCOUNTER — NURSE ONLY (OUTPATIENT)
Dept: RHEUMATOLOGY | Facility: CLINIC | Age: 61
End: 2021-03-15
Payer: COMMERCIAL

## 2021-03-15 DIAGNOSIS — M15.9 GENERALIZED OSTEOARTHRITIS: Primary | ICD-10-CM

## 2021-03-15 NOTE — PROGRESS NOTES
Pt here for prolia injection , no available labs . Will order labs to [facility] per pt request . Pt will make future appt when available .

## 2021-03-16 RX ORDER — ACETAMINOPHEN 160 MG
2000 TABLET,DISINTEGRATING ORAL DAILY
Qty: 30 CAPSULE | Refills: 3 | Status: SHIPPED | OUTPATIENT
Start: 2021-03-16

## 2021-03-16 NOTE — TELEPHONE ENCOUNTER
LOV: 11/9/2020  No future appointments.   Labs:   Component      Latest Ref Rng & Units 11/19/2020   VITAMIN D, 25-OH, TOTAL      30 - 100 ng/mL 25 (L)

## 2021-03-16 NOTE — TELEPHONE ENCOUNTER
Current Outpatient Medications   Medication Sig Dispense Refill   • Vitamin D3 50 MCG (2000 UT) Oral Cap Take 1 capsule (2,000 Units total) by mouth daily.  30 capsule 3

## 2021-04-10 DIAGNOSIS — Z23 NEED FOR VACCINATION: ICD-10-CM

## 2021-04-14 ENCOUNTER — TELEPHONE (OUTPATIENT)
Dept: RHEUMATOLOGY | Facility: CLINIC | Age: 61
End: 2021-04-14

## 2021-04-14 DIAGNOSIS — M81.0 OSTEOPOROSIS, UNSPECIFIED OSTEOPOROSIS TYPE, UNSPECIFIED PATHOLOGICAL FRACTURE PRESENCE: Primary | ICD-10-CM

## 2021-04-14 NOTE — TELEPHONE ENCOUNTER
Spoke with patient, getting BMP today. Prolia scheduled for 4/19/21. Will be due for DEXA in August 2021. Dr. Ronal Rodriguez, please sign order if agreeable.

## 2021-04-19 ENCOUNTER — TELEPHONE (OUTPATIENT)
Dept: RHEUMATOLOGY | Facility: CLINIC | Age: 61
End: 2021-04-19

## 2021-04-19 ENCOUNTER — NURSE ONLY (OUTPATIENT)
Dept: RHEUMATOLOGY | Facility: CLINIC | Age: 61
End: 2021-04-19
Payer: COMMERCIAL

## 2021-04-19 DIAGNOSIS — M81.0 OSTEOPOROSIS, UNSPECIFIED OSTEOPOROSIS TYPE, UNSPECIFIED PATHOLOGICAL FRACTURE PRESENCE: Primary | ICD-10-CM

## 2021-04-19 DIAGNOSIS — M81.0 POSTMENOPAUSAL OSTEOPOROSIS: Primary | ICD-10-CM

## 2021-04-19 PROCEDURE — 96372 THER/PROPH/DIAG INJ SC/IM: CPT | Performed by: INTERNAL MEDICINE

## 2021-04-19 NOTE — TELEPHONE ENCOUNTER
Pt was seen today for a prolia injection. Advised to RTC in 6 months with provider for annual visit and for next prolia injection. Please advise if you would like to order any labs prior to that appt for injection. Thank you.

## 2021-04-19 NOTE — TELEPHONE ENCOUNTER
Coming in for an appt with Dr. Ashly Malone on Monday October 25th.   Pt would also like to get her Prolia injection at that time as well

## 2021-04-19 NOTE — H&P
Patient presented today for Prolia injection. Confirmed BMP completed 4/14/21 and calcium level WNL. Subcutaneous injection given to the left upper arm. Patient tolerated well.  Patient advised to schedule 6 month follow up with Dr. Uriel Greer for annual v

## 2021-04-19 NOTE — PATIENT INSTRUCTIONS
Follow up in 6 months with doctor for annual visit and next injection of prolia. Schedule dexa bone scan in august 2021.

## 2021-04-20 NOTE — TELEPHONE ENCOUNTER
Called and notified the patient to have labs done 1 week prior to appt in October. Mailed [facility] lab orders to the patient's home address. Staff message placed for 1 month prior to appt to check on ins coverage for next injection.

## 2021-04-23 ENCOUNTER — TELEPHONE (OUTPATIENT)
Dept: RHEUMATOLOGY | Facility: CLINIC | Age: 61
End: 2021-04-23

## 2021-04-23 NOTE — TELEPHONE ENCOUNTER
Patient had her 4th Prolia injection 4/19/21. That night, she started having very bad body aches. The pain has lasted all week and hasn't improved. She's taking Tylenol which hasn't helped much and she can't take NSAIDs because of her stomach issues.  She

## 2021-04-28 NOTE — TELEPHONE ENCOUNTER
Noted,   Let pt. Know that we discuss repeating the prolia shot in 6 months at her upcoming visit at that time and then we can decide if she should cont. since this reaction was longer.

## 2021-04-28 NOTE — TELEPHONE ENCOUNTER
Left message per provider's message below and to call office back with questions or to schedule an appointment.

## 2021-10-07 ENCOUNTER — TELEPHONE (OUTPATIENT)
Dept: RHEUMATOLOGY | Facility: CLINIC | Age: 61
End: 2021-10-07

## 2021-10-07 DIAGNOSIS — M81.0 OSTEOPOROSIS, UNSPECIFIED OSTEOPOROSIS TYPE, UNSPECIFIED PATHOLOGICAL FRACTURE PRESENCE: Primary | ICD-10-CM

## 2021-10-07 NOTE — TELEPHONE ENCOUNTER
B/I started for prolia. Once complete, please let patient know to complete labs prior to appt.      Future Appointments   Date Time Provider Janice Cross   10/25/2021  2:30 PM Tk Licona MD [address] [facility] [address]

## 2021-10-12 NOTE — TELEPHONE ENCOUNTER
Spoke to patient, she has no insurance. She is looking to find insurance. She stated she lost her job and will not be coming to follow-up until she finds a new insurance. Appointment cancelled.

## 2021-10-12 NOTE — TELEPHONE ENCOUNTER
Spoke to patient and informed her of [address]   Number provided [phone] for prolia assistance    She was also provided Financial Counselor number to continue her care [phone]    Patient will call them and once her Prolia applic

## 2022-03-03 ENCOUNTER — APPOINTMENT (OUTPATIENT)
Dept: GENERAL RADIOLOGY | Facility: HOSPITAL | Age: 62
End: 2022-03-03
Attending: EMERGENCY MEDICINE

## 2022-03-03 ENCOUNTER — HOSPITAL ENCOUNTER (EMERGENCY)
Facility: HOSPITAL | Age: 62
Discharge: HOME | End: 2022-03-03
Attending: EMERGENCY MEDICINE

## 2022-03-03 VITALS
BODY MASS INDEX: 22 KG/M2 | DIASTOLIC BLOOD PRESSURE: 83 MMHG | RESPIRATION RATE: 20 BRPM | SYSTOLIC BLOOD PRESSURE: 124 MMHG | OXYGEN SATURATION: 99 % | TEMPERATURE: 98 F | HEART RATE: 82 BPM | WEIGHT: 134 LBS

## 2022-03-03 DIAGNOSIS — S22.000A THORACIC COMPRESSION FRACTURE, CLOSED, INITIAL ENCOUNTER (HCC): ICD-10-CM

## 2022-03-03 DIAGNOSIS — M54.6 THORACIC BACK PAIN, UNSPECIFIED BACK PAIN LATERALITY, UNSPECIFIED CHRONICITY: Primary | ICD-10-CM

## 2022-03-03 PROCEDURE — 72100 X-RAY EXAM L-S SPINE 2/3 VWS: CPT | Performed by: EMERGENCY MEDICINE

## 2022-03-03 PROCEDURE — 99284 EMERGENCY DEPT VISIT MOD MDM: CPT

## 2022-03-03 PROCEDURE — 96372 THER/PROPH/DIAG INJ SC/IM: CPT

## 2022-03-03 PROCEDURE — 72072 X-RAY EXAM THORAC SPINE 3VWS: CPT | Performed by: EMERGENCY MEDICINE

## 2022-03-03 PROCEDURE — 71046 X-RAY EXAM CHEST 2 VIEWS: CPT | Performed by: EMERGENCY MEDICINE

## 2022-03-03 RX ORDER — CYCLOBENZAPRINE HCL 10 MG
10 TABLET ORAL 3 TIMES DAILY PRN
Qty: 20 TABLET | Refills: 0 | Status: SHIPPED | OUTPATIENT
Start: 2022-03-03 | End: 2022-03-10

## 2022-03-03 RX ORDER — KETOROLAC TROMETHAMINE 30 MG/ML
30 INJECTION, SOLUTION INTRAMUSCULAR; INTRAVENOUS ONCE
Status: COMPLETED | OUTPATIENT
Start: 2022-03-03 | End: 2022-03-03

## 2022-03-03 RX ORDER — TRAMADOL HYDROCHLORIDE 50 MG/1
50 TABLET ORAL EVERY 6 HOURS PRN
Qty: 14 TABLET | Refills: 0 | Status: SHIPPED | OUTPATIENT
Start: 2022-03-03

## 2022-03-03 RX ORDER — METHYLPREDNISOLONE 4 MG/1
TABLET ORAL
Qty: 1 EACH | Refills: 0 | Status: SHIPPED | OUTPATIENT
Start: 2022-03-03

## 2022-03-03 NOTE — ED INITIAL ASSESSMENT (HPI)
Patient complains of thoracic and lumbar back pain that began became severe on monday. History of osteoporosis and atraumatic bone fx's. Denies any known injury or trauma. Pain worse with deep breathing.

## 2022-03-04 NOTE — CM/SW NOTE
Per Jeremiah, patient has an appt with Dr. Kimberly Tai, physiatrist on 3/21/22 and is looking for an earlier appt. [facility] called Dr. Eleanor Eugene office at [facility] and spoke with [address] [place] [address] who stated that there are no earlier appt with any of the physicians but that patient is on a wait list and will be called if anything becomes available. Debi asked that patient called her on Tuesday, 3/8/2022 after 2pm to check in with her. [facility] also sent response to Jeremiah encouraging patient to make f/u appt with PCP Dr. Ekta Tobar and to ask her for resources for other physiatrists that she may be able to see sooner.

## 2022-04-13 ENCOUNTER — TELEPHONE (OUTPATIENT)
Dept: RHEUMATOLOGY | Facility: CLINIC | Age: 62
End: 2022-04-13

## 2022-04-13 NOTE — TELEPHONE ENCOUNTER
Pt calling wanting to schedule appt with Dr José Avelar. She hasn't been for a while but pt has insurance now and has been dealing with bad back pain and looking to start her injections again. Asking if Dr José Avelar can get her in before she leaves for vacation. Please advise.

## 2022-04-14 ENCOUNTER — OFFICE VISIT (OUTPATIENT)
Dept: RHEUMATOLOGY | Facility: CLINIC | Age: 62
End: 2022-04-14
Payer: COMMERCIAL

## 2022-04-14 ENCOUNTER — TELEPHONE (OUTPATIENT)
Dept: RHEUMATOLOGY | Facility: CLINIC | Age: 62
End: 2022-04-14

## 2022-04-14 VITALS — WEIGHT: 139 LBS | BODY MASS INDEX: 22.34 KG/M2 | HEIGHT: 66 IN

## 2022-04-14 DIAGNOSIS — G89.29 CHRONIC MIDLINE LOW BACK PAIN WITHOUT SCIATICA: ICD-10-CM

## 2022-04-14 DIAGNOSIS — M54.6 CHRONIC MIDLINE THORACIC BACK PAIN: ICD-10-CM

## 2022-04-14 DIAGNOSIS — M81.0 AGE-RELATED OSTEOPOROSIS WITHOUT CURRENT PATHOLOGICAL FRACTURE: Primary | ICD-10-CM

## 2022-04-14 DIAGNOSIS — E55.9 VITAMIN D DEFICIENCY: ICD-10-CM

## 2022-04-14 DIAGNOSIS — G89.29 CHRONIC MIDLINE THORACIC BACK PAIN: ICD-10-CM

## 2022-04-14 DIAGNOSIS — M54.50 CHRONIC MIDLINE LOW BACK PAIN WITHOUT SCIATICA: ICD-10-CM

## 2022-04-14 RX ORDER — MULTIVIT WITH MINERALS/LUTEIN
250 TABLET ORAL DAILY
COMMUNITY

## 2022-04-14 RX ORDER — OMEPRAZOLE 40 MG/1
40 CAPSULE, DELAYED RELEASE ORAL 2 TIMES DAILY
COMMUNITY
Start: 2022-04-11

## 2022-04-14 RX ORDER — MELATONIN
1000 DAILY
COMMUNITY

## 2022-04-14 RX ORDER — CALCITONIN SALMON 200 [IU]/.09ML
1 SPRAY, METERED NASAL DAILY
Qty: 3.7 ML | Refills: 1 | Status: SHIPPED | OUTPATIENT
Start: 2022-04-14

## 2022-04-14 NOTE — TELEPHONE ENCOUNTER
Patient is calling back to get clarification, has questions on the MRI test.    Please call back [phone]

## 2022-04-14 NOTE — TELEPHONE ENCOUNTER
Called patient insurance at [phone]  Followed prompt for MRI PA. Provided member ID and  and automated states this plan does not required Pre-certification. It did not ask for CPT codes. MRI Thoracic [id number]  MRI Spine [id number]    Spoke to patient, she was informed of above. She will send us her insurance card to add into the system once she receives it in the mail. She has the number to schedule MRI.

## 2022-04-14 NOTE — TELEPHONE ENCOUNTER
Spoke to patient, she states she is unable to lay down and asking how will she do her Dexa and and MRI? She will also look into it to see if she can do her MRI sitting or standing up.

## 2022-04-16 NOTE — TELEPHONE ENCOUNTER
Can send in ativan one dose to take pre MRI or she can come in the same day as her MRI - and we can give her a tordal shot -

## 2022-04-18 NOTE — TELEPHONE ENCOUNTER
Called patient - name and  Verified. She was informed of below. She verbalized understanding. She said she not sure which one she wants, she will call us back. She also mentioned that if she can do open MRI. She was informed that is fine, she will have to double check with insurance to make sure they will cover it and facility takes her insurance. She verbalized understanding of that as well.

## 2022-04-19 LAB
ALBUMIN/GLOBULIN RATIO: 1.4 (CALC) (ref 1–2.5)
ALBUMIN: 4.4 G/DL (ref 3.6–5.1)
ALKALINE PHOSPHATASE: 129 U/L (ref 37–153)
ALT: 12 U/L (ref 6–29)
AST: 18 U/L (ref 10–35)
BILIRUBIN, TOTAL: 0.4 MG/DL (ref 0.2–1.2)
BUN: 8 MG/DL (ref 7–25)
C-REACTIVE PROTEIN: 5.9 MG/L
CALCIUM: 9.8 MG/DL (ref 8.6–10.4)
CARBON DIOXIDE: 30 MMOL/L (ref 20–32)
CHLORIDE: 98 MMOL/L (ref 98–110)
CREATININE: 0.64 MG/DL (ref 0.5–0.99)
EGFR IF AFRICN AM: 110 ML/MIN/1.73M2
EGFR IF NONAFRICN AM: 95 ML/MIN/1.73M2
GLOBULIN: 3.2 G/DL (CALC) (ref 1.9–3.7)
GLUCOSE: 80 MG/DL (ref 65–99)
HEMATOCRIT: 38.8 % (ref 35–45)
HEMOGLOBIN: 13.1 G/DL (ref 11.7–15.5)
MCH: 31 PG (ref 27–33)
MCHC: 33.8 G/DL (ref 32–36)
MCV: 91.7 FL (ref 80–100)
MPV: 10.1 FL (ref 7.5–12.5)
PLATELET COUNT: 455 THOUSAND/UL (ref 140–400)
POTASSIUM: 4.1 MMOL/L (ref 3.5–5.3)
PROTEIN, TOTAL: 7.6 G/DL (ref 6.1–8.1)
RDW: 12.1 % (ref 11–15)
RED BLOOD CELL COUNT: 4.23 MILLION/UL (ref 3.8–5.1)
SED RATE BY MODIFIED$WESTERGREN: 28 MM/H
SODIUM: 137 MMOL/L (ref 135–146)
VITAMIN D, 25-OH, TOTAL: 36 NG/ML (ref 30–100)
WHITE BLOOD CELL COUNT: 7.5 THOUSAND/UL (ref 3.8–10.8)

## 2022-04-20 ENCOUNTER — TELEPHONE (OUTPATIENT)
Dept: RHEUMATOLOGY | Facility: CLINIC | Age: 62
End: 2022-04-20

## 2022-04-20 NOTE — TELEPHONE ENCOUNTER
Patient needs Toradol shot before her bone scan test at [facility] facility on Sat. 4-23 at [address] [place] [address]. Please call patient.

## 2022-04-20 NOTE — TELEPHONE ENCOUNTER
Pt advised of below per provider. Pt declined stating the Toradol injection given on Friday will not last until her appointment on Saturday. Pt stated she will figure something else out.

## 2022-04-20 NOTE — TELEPHONE ENCOUNTER
It was discussed in a telephone visit - let her know - that only able to give a tordal shot Monday through Friday - if she wants to get the shot this Friday we can do that but not this saturday

## 2022-04-22 ENCOUNTER — TELEPHONE (OUTPATIENT)
Dept: RHEUMATOLOGY | Facility: CLINIC | Age: 62
End: 2022-04-22

## 2022-04-22 NOTE — TELEPHONE ENCOUNTER
Patient is requesting her MRI orders to be faxed to [facility] in [address], [address] [facility]. Fax # [phone].

## 2022-04-23 ENCOUNTER — HOSPITAL ENCOUNTER (OUTPATIENT)
Dept: BONE DENSITY | Age: 62
Discharge: HOME | End: 2022-04-23
Attending: INTERNAL MEDICINE
Payer: COMMERCIAL

## 2022-04-23 DIAGNOSIS — M81.0 AGE-RELATED OSTEOPOROSIS WITHOUT CURRENT PATHOLOGICAL FRACTURE: ICD-10-CM

## 2022-04-23 PROCEDURE — 77080 DXA BONE DENSITY AXIAL: CPT | Performed by: INTERNAL MEDICINE

## 2022-04-24 ENCOUNTER — TELEPHONE (OUTPATIENT)
Dept: RHEUMATOLOGY | Facility: CLINIC | Age: 62
End: 2022-04-24

## 2022-04-24 ENCOUNTER — PATIENT MESSAGE (OUTPATIENT)
Dept: RHEUMATOLOGY | Facility: CLINIC | Age: 62
End: 2022-04-24

## 2022-04-24 DIAGNOSIS — M81.0 AGE-RELATED OSTEOPOROSIS WITHOUT CURRENT PATHOLOGICAL FRACTURE: Primary | ICD-10-CM

## 2022-04-25 NOTE — TELEPHONE ENCOUNTER
From: Maryanne Mckee  To: Scarlett Sharma MD  Sent: 4/24/2022 4:57 PM CDT  Subject: Question regarding DEXA Scan    Can we start the Prolia shots again? If so,this week?

## 2022-04-25 NOTE — TELEPHONE ENCOUNTER
Talked to pt. She was safe with prolia doenst' want forteo  Referral for endo for possibly Located within [facility]   [phone]     Calcitonin spray made her feel more pain -   So she stopped it.      She still has a lot of back pain   Consideration if she needs kyphoplasty -     Wait til see what the MRI shows - and see if this is a consideration   Asked her to message when she does her MRI and then can consider endocrine right away or possibly interventional radiology for kyphoplasty

## 2022-06-02 ENCOUNTER — OFFICE VISIT (OUTPATIENT)
Dept: RHEUMATOLOGY | Facility: CLINIC | Age: 62
End: 2022-06-02
Payer: MEDICAID

## 2022-06-02 ENCOUNTER — TELEPHONE (OUTPATIENT)
Dept: RHEUMATOLOGY | Facility: CLINIC | Age: 62
End: 2022-06-02

## 2022-06-02 VITALS
WEIGHT: 132 LBS | HEIGHT: 66 IN | DIASTOLIC BLOOD PRESSURE: 76 MMHG | BODY MASS INDEX: 21.21 KG/M2 | HEART RATE: 68 BPM | SYSTOLIC BLOOD PRESSURE: 117 MMHG

## 2022-06-02 DIAGNOSIS — M54.6 CHRONIC MIDLINE THORACIC BACK PAIN: ICD-10-CM

## 2022-06-02 DIAGNOSIS — G89.29 CHRONIC BILATERAL LOW BACK PAIN WITHOUT SCIATICA: Primary | ICD-10-CM

## 2022-06-02 DIAGNOSIS — M54.50 CHRONIC BILATERAL LOW BACK PAIN WITHOUT SCIATICA: Primary | ICD-10-CM

## 2022-06-02 DIAGNOSIS — M81.0 AGE-RELATED OSTEOPOROSIS WITHOUT CURRENT PATHOLOGICAL FRACTURE: ICD-10-CM

## 2022-06-02 DIAGNOSIS — G89.29 CHRONIC MIDLINE THORACIC BACK PAIN: ICD-10-CM

## 2022-06-02 PROCEDURE — 3008F BODY MASS INDEX DOCD: CPT | Performed by: INTERNAL MEDICINE

## 2022-06-02 PROCEDURE — 3078F DIAST BP <80 MM HG: CPT | Performed by: INTERNAL MEDICINE

## 2022-06-02 PROCEDURE — 99214 OFFICE O/P EST MOD 30 MIN: CPT | Performed by: INTERNAL MEDICINE

## 2022-06-02 PROCEDURE — 3074F SYST BP LT 130 MM HG: CPT | Performed by: INTERNAL MEDICINE

## 2022-06-02 RX ORDER — IBUPROFEN 800 MG/1
800 TABLET ORAL EVERY 6 HOURS PRN
COMMUNITY
End: 2022-06-02

## 2022-06-02 RX ORDER — IBUPROFEN 800 MG/1
800 TABLET ORAL EVERY 8 HOURS PRN
Qty: 90 TABLET | Refills: 1 | Status: SHIPPED | OUTPATIENT
Start: 2022-06-02

## 2022-06-02 NOTE — PATIENT INSTRUCTIONS
1. Physiatry referral - [phone]  2. Schedule mri lumbar spine   3  Endocrinology referral for evenity   4.  Return to clinic in 6 months   5. iburpofen 800mg every 8 hours as needed #60

## 2022-06-02 NOTE — TELEPHONE ENCOUNTER
Per reference tab, PA not required for MRI. Left message to call back and number provided to schedule MRI.

## 2022-06-03 ENCOUNTER — PATIENT MESSAGE (OUTPATIENT)
Dept: RHEUMATOLOGY | Facility: CLINIC | Age: 62
End: 2022-06-03

## 2022-06-03 NOTE — TELEPHONE ENCOUNTER
From: Dawn Bland  To: Marilin Alexis MD  Sent: 6/3/2022 1:13 PM CDT  Subject: Handycap sticker    If it's possible to get one it would really help until I get my back with less pain, I would really appreciate it, I can pick it up tomorrow or Monday?  Thank you!! Elias Deras

## 2022-06-07 ENCOUNTER — TELEPHONE (OUTPATIENT)
Dept: FAMILY MEDICINE CLINIC | Facility: CLINIC | Age: 62
End: 2022-06-07

## 2022-06-07 NOTE — TELEPHONE ENCOUNTER
Patient dropped off appeal form to be completed at [place] placed in Dr. Lucy Valladares folder. Per patient if any questions give her a call.

## 2022-06-09 NOTE — TELEPHONE ENCOUNTER
Spoke to patient, she states she is trying to appeal cost of her visit with Dr. Kezia Chun in April. She was informed this form does not seem like an appeal form. She will reach out to her insurance to figure out what they need and let us know if she needs anything from us.

## 2022-06-14 ENCOUNTER — OFFICE VISIT (OUTPATIENT)
Dept: PHYSICAL MEDICINE AND REHAB | Facility: CLINIC | Age: 62
End: 2022-06-14
Payer: MEDICAID

## 2022-06-14 VITALS
SYSTOLIC BLOOD PRESSURE: 110 MMHG | HEIGHT: 66 IN | DIASTOLIC BLOOD PRESSURE: 70 MMHG | BODY MASS INDEX: 21.21 KG/M2 | WEIGHT: 132 LBS

## 2022-06-14 DIAGNOSIS — M48.54XS NONTRAUMATIC COMPRESSION FRACTURE OF T6 VERTEBRA, SEQUELA: Primary | ICD-10-CM

## 2022-06-14 DIAGNOSIS — M47.816 FACET ARTHROPATHY, LUMBAR: ICD-10-CM

## 2022-06-14 PROCEDURE — 99244 OFF/OP CNSLTJ NEW/EST MOD 40: CPT | Performed by: PHYSICAL MEDICINE & REHABILITATION

## 2022-06-14 PROCEDURE — 3008F BODY MASS INDEX DOCD: CPT | Performed by: PHYSICAL MEDICINE & REHABILITATION

## 2022-06-14 PROCEDURE — 3078F DIAST BP <80 MM HG: CPT | Performed by: PHYSICAL MEDICINE & REHABILITATION

## 2022-06-14 PROCEDURE — 3074F SYST BP LT 130 MM HG: CPT | Performed by: PHYSICAL MEDICINE & REHABILITATION

## 2022-06-14 RX ORDER — MELOXICAM 15 MG/1
15 TABLET ORAL DAILY
Qty: 14 TABLET | Refills: 0 | Status: SHIPPED | OUTPATIENT
Start: 2022-06-14 | End: 2022-06-28

## 2022-06-15 ENCOUNTER — APPOINTMENT (OUTPATIENT)
Dept: PHYSICAL THERAPY | Facility: HOSPITAL | Age: 62
End: 2022-06-15
Attending: PHYSICAL MEDICINE & REHABILITATION
Payer: MEDICAID

## 2022-06-15 ENCOUNTER — PATIENT MESSAGE (OUTPATIENT)
Dept: PHYSICAL MEDICINE AND REHAB | Facility: CLINIC | Age: 62
End: 2022-06-15

## 2022-06-15 ENCOUNTER — TELEPHONE (OUTPATIENT)
Dept: PHYSICAL MEDICINE AND REHAB | Facility: CLINIC | Age: 62
End: 2022-06-15

## 2022-06-15 NOTE — TELEPHONE ENCOUNTER
From: Zahraa Sanchez  To: Karyna Sullivan. Daisy Ruanoing, DO  Sent: 6/15/2022 9:21 AM CDT  Subject: Physical Therapy    Hello, I made appointment for therapy but first available time is August 11th,at all the facilities. Is this OK or do you want to refer somewhere else?  Thanks, Roberto Simons

## 2022-06-17 ENCOUNTER — APPOINTMENT (OUTPATIENT)
Dept: PHYSICAL THERAPY | Facility: HOSPITAL | Age: 62
End: 2022-06-17
Attending: PHYSICAL MEDICINE & REHABILITATION
Payer: MEDICAID

## 2022-06-23 ENCOUNTER — TELEPHONE (OUTPATIENT)
Dept: RHEUMATOLOGY | Facility: CLINIC | Age: 62
End: 2022-06-23

## 2022-06-23 NOTE — TELEPHONE ENCOUNTER
Pt dropped off disability forms for Dr Sanjana Reno to sign and send off. Pt filled out HIPAA and paid the $25 fee.  Forms emailed to forms dept

## 2022-07-18 ENCOUNTER — TELEPHONE (OUTPATIENT)
Dept: PHYSICAL THERAPY | Facility: HOSPITAL | Age: 62
End: 2022-07-18

## 2022-07-18 ENCOUNTER — TELEPHONE (OUTPATIENT)
Dept: PHYSICAL THERAPY | Age: 62
End: 2022-07-18

## 2022-07-20 ENCOUNTER — PATIENT MESSAGE (OUTPATIENT)
Dept: RHEUMATOLOGY | Facility: CLINIC | Age: 62
End: 2022-07-20

## 2022-07-20 NOTE — TELEPHONE ENCOUNTER
From: Rocky Joy  To: Concepcion Paul MD  Sent: 7/20/2022 12:18 PM CDT  Subject: Phone call follow up    Hi Dr Sanjana Reno. I received your message today, so I have an appointment with Dr. Carlita Guerrero August 11th to talk about 1 year shots, and I start therapy this Friday with Clarissa Heath PT there at the hospital per Dr. Carmenza Cox hope all this works to help with pain, thanks!  Shemar Silva

## 2022-07-21 ENCOUNTER — APPOINTMENT (OUTPATIENT)
Dept: PHYSICAL THERAPY | Facility: HOSPITAL | Age: 62
End: 2022-07-21
Attending: FAMILY MEDICINE
Payer: MEDICAID

## 2022-07-22 ENCOUNTER — OFFICE VISIT (OUTPATIENT)
Dept: PHYSICAL THERAPY | Facility: HOSPITAL | Age: 62
End: 2022-07-22
Attending: FAMILY MEDICINE
Payer: MEDICAID

## 2022-07-22 DIAGNOSIS — M47.816 FACET ARTHROPATHY, LUMBAR: ICD-10-CM

## 2022-07-22 DIAGNOSIS — M48.54XS NONTRAUMATIC COMPRESSION FRACTURE OF T6 VERTEBRA, SEQUELA: ICD-10-CM

## 2022-07-22 PROCEDURE — 97110 THERAPEUTIC EXERCISES: CPT | Performed by: PHYSICAL THERAPIST

## 2022-07-22 PROCEDURE — 97161 PT EVAL LOW COMPLEX 20 MIN: CPT | Performed by: PHYSICAL THERAPIST

## 2022-07-26 ENCOUNTER — OFFICE VISIT (OUTPATIENT)
Dept: PHYSICAL THERAPY | Facility: HOSPITAL | Age: 62
End: 2022-07-26
Attending: PHYSICAL MEDICINE & REHABILITATION
Payer: MEDICAID

## 2022-07-26 PROCEDURE — 97110 THERAPEUTIC EXERCISES: CPT | Performed by: PHYSICAL THERAPIST

## 2022-07-28 ENCOUNTER — APPOINTMENT (OUTPATIENT)
Dept: PHYSICAL THERAPY | Facility: HOSPITAL | Age: 62
End: 2022-07-28
Attending: PHYSICAL MEDICINE & REHABILITATION
Payer: MEDICAID

## 2022-08-02 ENCOUNTER — OFFICE VISIT (OUTPATIENT)
Dept: PHYSICAL THERAPY | Facility: HOSPITAL | Age: 62
End: 2022-08-02
Attending: PHYSICAL MEDICINE & REHABILITATION
Payer: MEDICAID

## 2022-08-02 DIAGNOSIS — M47.816 FACET ARTHROPATHY, LUMBAR: ICD-10-CM

## 2022-08-02 DIAGNOSIS — M48.54XS NONTRAUMATIC COMPRESSION FRACTURE OF T6 VERTEBRA, SEQUELA: ICD-10-CM

## 2022-08-02 PROCEDURE — 97110 THERAPEUTIC EXERCISES: CPT | Performed by: PHYSICAL THERAPIST

## 2022-08-08 ENCOUNTER — OFFICE VISIT (OUTPATIENT)
Dept: PHYSICAL THERAPY | Facility: HOSPITAL | Age: 62
End: 2022-08-08
Attending: PHYSICAL MEDICINE & REHABILITATION
Payer: MEDICAID

## 2022-08-08 PROCEDURE — 97110 THERAPEUTIC EXERCISES: CPT | Performed by: PHYSICAL THERAPIST

## 2022-08-09 ENCOUNTER — OFFICE VISIT (OUTPATIENT)
Dept: ENDOCRINOLOGY CLINIC | Facility: CLINIC | Age: 62
End: 2022-08-09
Payer: MEDICAID

## 2022-08-09 ENCOUNTER — LAB ENCOUNTER (OUTPATIENT)
Dept: LAB | Facility: HOSPITAL | Age: 62
End: 2022-08-09
Attending: INTERNAL MEDICINE
Payer: MEDICAID

## 2022-08-09 ENCOUNTER — TELEPHONE (OUTPATIENT)
Dept: ENDOCRINOLOGY CLINIC | Facility: CLINIC | Age: 62
End: 2022-08-09

## 2022-08-09 VITALS
WEIGHT: 128.38 LBS | BODY MASS INDEX: 21 KG/M2 | SYSTOLIC BLOOD PRESSURE: 114 MMHG | HEART RATE: 78 BPM | DIASTOLIC BLOOD PRESSURE: 79 MMHG

## 2022-08-09 DIAGNOSIS — M81.0 AGE-RELATED OSTEOPOROSIS WITHOUT CURRENT PATHOLOGICAL FRACTURE: ICD-10-CM

## 2022-08-09 DIAGNOSIS — E55.9 VITAMIN D DEFICIENCY: Primary | ICD-10-CM

## 2022-08-09 DIAGNOSIS — M81.0 SENILE OSTEOPOROSIS: Primary | ICD-10-CM

## 2022-08-09 LAB
PTH-INTACT SERPL-MCNC: 62.5 PG/ML (ref 18.5–88)
VIT D+METAB SERPL-MCNC: 31.4 NG/ML (ref 30–100)

## 2022-08-09 PROCEDURE — 84080 ASSAY ALKALINE PHOSPHATASES: CPT

## 2022-08-09 PROCEDURE — 36415 COLL VENOUS BLD VENIPUNCTURE: CPT | Performed by: INTERNAL MEDICINE

## 2022-08-09 PROCEDURE — 83970 ASSAY OF PARATHORMONE: CPT | Performed by: INTERNAL MEDICINE

## 2022-08-09 PROCEDURE — 82306 VITAMIN D 25 HYDROXY: CPT | Performed by: INTERNAL MEDICINE

## 2022-08-11 ENCOUNTER — OFFICE VISIT (OUTPATIENT)
Dept: PHYSICAL THERAPY | Facility: HOSPITAL | Age: 62
End: 2022-08-11
Attending: PHYSICAL MEDICINE & REHABILITATION
Payer: MEDICAID

## 2022-08-11 LAB — BONE SPECIFIC ALKALINE PHOSPHATASE: 18.7 UG/L

## 2022-08-11 PROCEDURE — 97110 THERAPEUTIC EXERCISES: CPT | Performed by: PHYSICAL THERAPIST

## 2022-08-15 ENCOUNTER — OFFICE VISIT (OUTPATIENT)
Dept: PHYSICAL THERAPY | Facility: HOSPITAL | Age: 62
End: 2022-08-15
Attending: PHYSICAL MEDICINE & REHABILITATION
Payer: MEDICAID

## 2022-08-15 PROCEDURE — 97110 THERAPEUTIC EXERCISES: CPT | Performed by: PHYSICAL THERAPIST

## 2022-08-15 PROCEDURE — 97112 NEUROMUSCULAR REEDUCATION: CPT | Performed by: PHYSICAL THERAPIST

## 2022-08-18 ENCOUNTER — OFFICE VISIT (OUTPATIENT)
Dept: PHYSICAL THERAPY | Facility: HOSPITAL | Age: 62
End: 2022-08-18
Attending: PHYSICAL MEDICINE & REHABILITATION
Payer: MEDICAID

## 2022-08-18 PROCEDURE — 97110 THERAPEUTIC EXERCISES: CPT | Performed by: PHYSICAL THERAPIST

## 2022-08-18 PROCEDURE — 97112 NEUROMUSCULAR REEDUCATION: CPT | Performed by: PHYSICAL THERAPIST

## 2022-08-22 ENCOUNTER — OFFICE VISIT (OUTPATIENT)
Dept: PHYSICAL THERAPY | Facility: HOSPITAL | Age: 62
End: 2022-08-22
Attending: PHYSICAL MEDICINE & REHABILITATION
Payer: MEDICAID

## 2022-08-22 PROCEDURE — 97110 THERAPEUTIC EXERCISES: CPT | Performed by: PHYSICAL THERAPIST

## 2022-08-25 ENCOUNTER — OFFICE VISIT (OUTPATIENT)
Dept: PHYSICAL THERAPY | Facility: HOSPITAL | Age: 62
End: 2022-08-25
Attending: PHYSICAL MEDICINE & REHABILITATION
Payer: MEDICAID

## 2022-08-25 PROCEDURE — 97110 THERAPEUTIC EXERCISES: CPT | Performed by: PHYSICAL THERAPIST

## 2022-08-29 ENCOUNTER — APPOINTMENT (OUTPATIENT)
Dept: PHYSICAL THERAPY | Facility: HOSPITAL | Age: 62
End: 2022-08-29
Attending: PHYSICAL MEDICINE & REHABILITATION
Payer: MEDICAID

## 2022-08-29 ENCOUNTER — TELEPHONE (OUTPATIENT)
Dept: PHYSICAL THERAPY | Facility: HOSPITAL | Age: 62
End: 2022-08-29

## 2022-08-30 ENCOUNTER — OFFICE VISIT (OUTPATIENT)
Dept: PHYSICAL MEDICINE AND REHAB | Facility: CLINIC | Age: 62
End: 2022-08-30
Payer: MEDICAID

## 2022-08-30 ENCOUNTER — NURSE ONLY (OUTPATIENT)
Dept: ENDOCRINOLOGY CLINIC | Facility: CLINIC | Age: 62
End: 2022-08-30
Payer: MEDICAID

## 2022-08-30 VITALS
WEIGHT: 128 LBS | HEART RATE: 74 BPM | BODY MASS INDEX: 20.57 KG/M2 | OXYGEN SATURATION: 99 % | DIASTOLIC BLOOD PRESSURE: 82 MMHG | SYSTOLIC BLOOD PRESSURE: 110 MMHG | HEIGHT: 66 IN

## 2022-08-30 DIAGNOSIS — M81.0 AGE-RELATED OSTEOPOROSIS WITHOUT CURRENT PATHOLOGICAL FRACTURE: Primary | ICD-10-CM

## 2022-08-30 DIAGNOSIS — M47.816 FACET ARTHROPATHY, LUMBAR: ICD-10-CM

## 2022-08-30 DIAGNOSIS — M48.54XS NONTRAUMATIC COMPRESSION FRACTURE OF T6 VERTEBRA, SEQUELA: Primary | ICD-10-CM

## 2022-08-30 PROCEDURE — 3074F SYST BP LT 130 MM HG: CPT | Performed by: PHYSICAL MEDICINE & REHABILITATION

## 2022-08-30 PROCEDURE — 3008F BODY MASS INDEX DOCD: CPT | Performed by: PHYSICAL MEDICINE & REHABILITATION

## 2022-08-30 PROCEDURE — 99214 OFFICE O/P EST MOD 30 MIN: CPT | Performed by: PHYSICAL MEDICINE & REHABILITATION

## 2022-08-30 PROCEDURE — 96372 THER/PROPH/DIAG INJ SC/IM: CPT | Performed by: INTERNAL MEDICINE

## 2022-08-30 PROCEDURE — 3079F DIAST BP 80-89 MM HG: CPT | Performed by: PHYSICAL MEDICINE & REHABILITATION

## 2022-08-30 RX ORDER — MELOXICAM 15 MG/1
15 TABLET ORAL DAILY
Qty: 14 TABLET | Refills: 0 | Status: SHIPPED | OUTPATIENT
Start: 2022-08-30 | End: 2022-09-13

## 2022-08-30 NOTE — PATIENT INSTRUCTIONS
-Start Mobic daily for the next 2 weeks  -Ice/heat   -Continue PT and home exercises  -Follow up in 4 weeks  -If no better, will discuss kyphoplasty and possible injection in the joints in the lower back

## 2022-08-30 NOTE — PROGRESS NOTES
Patient presents for her first Evenity injection. Pt confirmed that provider discussed the medication with her during her last office visit. RN reviewed medication is a bone building medication and makes her bones stronger; that it is given monthly subcutaneously. RN also discussed potential side effects such as generalized myalgia, pain/soreness at the injection sites. Pt was also advised to inform her dentist that she is now on Evenity therapy. Patient gave permission to proceed with the medication administration. This RN administered a total of 210 mg of Evenity, 105 mg at each upper arms. Pt tolerated the administration well without immediate adverse reaction 10 minutes post injection. RN scheduled patient for second dose as below. Pt left the clinic without complaints of SOB, chest pain, dizziness.       Future Appointments   Date Time Provider aJnice Cross   9/30/2022  9:00 AM [facility] SANDRA RN [facility]

## 2022-09-01 ENCOUNTER — OFFICE VISIT (OUTPATIENT)
Dept: PHYSICAL THERAPY | Facility: HOSPITAL | Age: 62
End: 2022-09-01
Attending: PHYSICAL MEDICINE & REHABILITATION
Payer: MEDICAID

## 2022-09-01 PROCEDURE — 97110 THERAPEUTIC EXERCISES: CPT | Performed by: PHYSICAL THERAPIST

## 2022-09-06 ENCOUNTER — APPOINTMENT (OUTPATIENT)
Dept: PHYSICAL THERAPY | Facility: HOSPITAL | Age: 62
End: 2022-09-06
Attending: PHYSICAL MEDICINE & REHABILITATION
Payer: MEDICAID

## 2022-09-07 ENCOUNTER — OFFICE VISIT (OUTPATIENT)
Dept: PHYSICAL THERAPY | Facility: HOSPITAL | Age: 62
End: 2022-09-07
Attending: PHYSICAL MEDICINE & REHABILITATION
Payer: MEDICAID

## 2022-09-07 PROCEDURE — 97110 THERAPEUTIC EXERCISES: CPT | Performed by: PHYSICAL THERAPIST

## 2022-09-09 ENCOUNTER — TELEPHONE (OUTPATIENT)
Dept: PHYSICAL THERAPY | Facility: HOSPITAL | Age: 62
End: 2022-09-09

## 2022-09-12 ENCOUNTER — OFFICE VISIT (OUTPATIENT)
Dept: PHYSICAL THERAPY | Facility: HOSPITAL | Age: 62
End: 2022-09-12
Attending: PHYSICAL MEDICINE & REHABILITATION
Payer: MEDICAID

## 2022-09-12 PROCEDURE — 97110 THERAPEUTIC EXERCISES: CPT | Performed by: PHYSICAL THERAPIST

## 2022-09-20 ENCOUNTER — OFFICE VISIT (OUTPATIENT)
Dept: PHYSICAL THERAPY | Facility: HOSPITAL | Age: 62
End: 2022-09-20
Attending: PHYSICAL MEDICINE & REHABILITATION
Payer: MEDICAID

## 2022-09-20 PROCEDURE — 97110 THERAPEUTIC EXERCISES: CPT | Performed by: PHYSICAL THERAPIST

## 2022-09-27 ENCOUNTER — OFFICE VISIT (OUTPATIENT)
Dept: PHYSICAL MEDICINE AND REHAB | Facility: CLINIC | Age: 62
End: 2022-09-27

## 2022-09-27 ENCOUNTER — APPOINTMENT (OUTPATIENT)
Dept: PHYSICAL THERAPY | Facility: HOSPITAL | Age: 62
End: 2022-09-27
Attending: PHYSICAL MEDICINE & REHABILITATION
Payer: MEDICAID

## 2022-09-27 ENCOUNTER — OFFICE VISIT (OUTPATIENT)
Dept: INTERNAL MEDICINE CLINIC | Facility: CLINIC | Age: 62
End: 2022-09-27

## 2022-09-27 VITALS
HEART RATE: 62 BPM | DIASTOLIC BLOOD PRESSURE: 69 MMHG | RESPIRATION RATE: 16 BRPM | HEIGHT: 66 IN | BODY MASS INDEX: 20.47 KG/M2 | SYSTOLIC BLOOD PRESSURE: 106 MMHG | WEIGHT: 127.38 LBS

## 2022-09-27 VITALS
HEIGHT: 66 IN | HEART RATE: 76 BPM | SYSTOLIC BLOOD PRESSURE: 112 MMHG | DIASTOLIC BLOOD PRESSURE: 80 MMHG | WEIGHT: 127 LBS | BODY MASS INDEX: 20.41 KG/M2

## 2022-09-27 DIAGNOSIS — M47.816 FACET ARTHROPATHY, LUMBAR: ICD-10-CM

## 2022-09-27 DIAGNOSIS — Z12.11 COLON CANCER SCREENING: ICD-10-CM

## 2022-09-27 DIAGNOSIS — D75.839 THROMBOCYTOSIS: ICD-10-CM

## 2022-09-27 DIAGNOSIS — Z00.00 PHYSICAL EXAM, ANNUAL: Primary | ICD-10-CM

## 2022-09-27 DIAGNOSIS — M48.54XS NONTRAUMATIC COMPRESSION FRACTURE OF T6 VERTEBRA, SEQUELA: Primary | ICD-10-CM

## 2022-09-27 PROBLEM — K21.9 CHRONIC GERD: Status: ACTIVE | Noted: 2019-04-23

## 2022-09-27 PROCEDURE — 3008F BODY MASS INDEX DOCD: CPT | Performed by: PHYSICAL MEDICINE & REHABILITATION

## 2022-09-27 PROCEDURE — 3078F DIAST BP <80 MM HG: CPT | Performed by: INTERNAL MEDICINE

## 2022-09-27 PROCEDURE — 3074F SYST BP LT 130 MM HG: CPT | Performed by: PHYSICAL MEDICINE & REHABILITATION

## 2022-09-27 PROCEDURE — 3008F BODY MASS INDEX DOCD: CPT | Performed by: INTERNAL MEDICINE

## 2022-09-27 PROCEDURE — 3074F SYST BP LT 130 MM HG: CPT | Performed by: INTERNAL MEDICINE

## 2022-09-27 PROCEDURE — 99386 PREV VISIT NEW AGE 40-64: CPT | Performed by: INTERNAL MEDICINE

## 2022-09-27 PROCEDURE — 3079F DIAST BP 80-89 MM HG: CPT | Performed by: PHYSICAL MEDICINE & REHABILITATION

## 2022-09-27 PROCEDURE — 99214 OFFICE O/P EST MOD 30 MIN: CPT | Performed by: PHYSICAL MEDICINE & REHABILITATION

## 2022-09-27 NOTE — PATIENT INSTRUCTIONS
-Continue PT and Tylenol  -Continue home exercises  -Follow up in 3 months  -If lower back is still painful, will consider cortisone injection

## 2022-09-28 ENCOUNTER — LAB ENCOUNTER (OUTPATIENT)
Dept: LAB | Facility: HOSPITAL | Age: 62
End: 2022-09-28
Attending: INTERNAL MEDICINE
Payer: MEDICAID

## 2022-09-28 ENCOUNTER — MED REC SCAN ONLY (OUTPATIENT)
Dept: INTERNAL MEDICINE CLINIC | Facility: CLINIC | Age: 62
End: 2022-09-28

## 2022-09-28 LAB
ALBUMIN SERPL-MCNC: 3.8 G/DL (ref 3.4–5)
ALBUMIN/GLOB SERPL: 1 {RATIO} (ref 1–2)
ALP LIVER SERPL-CCNC: 121 U/L
ALT SERPL-CCNC: 16 U/L
ANION GAP SERPL CALC-SCNC: 5 MMOL/L (ref 0–18)
AST SERPL-CCNC: 18 U/L (ref 15–37)
BILIRUB SERPL-MCNC: 0.4 MG/DL (ref 0.1–2)
BUN BLD-MCNC: 7 MG/DL (ref 7–18)
BUN/CREAT SERPL: 10.8 (ref 10–20)
CALCIUM BLD-MCNC: 9.2 MG/DL (ref 8.5–10.1)
CHLORIDE SERPL-SCNC: 104 MMOL/L (ref 98–112)
CHOLEST SERPL-MCNC: 241 MG/DL (ref ?–200)
CO2 SERPL-SCNC: 29 MMOL/L (ref 21–32)
CREAT BLD-MCNC: 0.65 MG/DL
DEPRECATED RDW RBC AUTO: 44.1 FL (ref 35.1–46.3)
ERYTHROCYTE [DISTWIDTH] IN BLOOD BY AUTOMATED COUNT: 12.3 % (ref 11–15)
FASTING PATIENT LIPID ANSWER: YES
FASTING STATUS PATIENT QL REPORTED: YES
GFR SERPLBLD BASED ON 1.73 SQ M-ARVRAT: 99 ML/MIN/1.73M2 (ref 60–?)
GLOBULIN PLAS-MCNC: 4 G/DL (ref 2.8–4.4)
GLUCOSE BLD-MCNC: 78 MG/DL (ref 70–99)
HCT VFR BLD AUTO: 40.9 %
HDLC SERPL-MCNC: 91 MG/DL (ref 40–59)
HGB BLD-MCNC: 13.4 G/DL
LDLC SERPL CALC-MCNC: 124 MG/DL (ref ?–100)
MCH RBC QN AUTO: 31.7 PG (ref 26–34)
MCHC RBC AUTO-ENTMCNC: 32.8 G/DL (ref 31–37)
MCV RBC AUTO: 96.7 FL
NONHDLC SERPL-MCNC: 150 MG/DL (ref ?–130)
OSMOLALITY SERPL CALC.SUM OF ELEC: 283 MOSM/KG (ref 275–295)
PLATELET # BLD AUTO: 421 10(3)UL (ref 150–450)
POTASSIUM SERPL-SCNC: 4.1 MMOL/L (ref 3.5–5.1)
PROT SERPL-MCNC: 7.8 G/DL (ref 6.4–8.2)
RBC # BLD AUTO: 4.23 X10(6)UL
SODIUM SERPL-SCNC: 138 MMOL/L (ref 136–145)
TRIGL SERPL-MCNC: 153 MG/DL (ref 30–149)
TSI SER-ACNC: 0.56 MIU/ML (ref 0.36–3.74)
VLDLC SERPL CALC-MCNC: 27 MG/DL (ref 0–30)
WBC # BLD AUTO: 5.1 X10(3) UL (ref 4–11)

## 2022-09-28 PROCEDURE — 80053 COMPREHEN METABOLIC PANEL: CPT | Performed by: INTERNAL MEDICINE

## 2022-09-28 PROCEDURE — 36415 COLL VENOUS BLD VENIPUNCTURE: CPT | Performed by: INTERNAL MEDICINE

## 2022-09-28 PROCEDURE — 80061 LIPID PANEL: CPT | Performed by: INTERNAL MEDICINE

## 2022-09-28 PROCEDURE — 85027 COMPLETE CBC AUTOMATED: CPT | Performed by: INTERNAL MEDICINE

## 2022-09-28 PROCEDURE — 84443 ASSAY THYROID STIM HORMONE: CPT | Performed by: INTERNAL MEDICINE

## 2022-09-30 ENCOUNTER — NURSE ONLY (OUTPATIENT)
Dept: ENDOCRINOLOGY CLINIC | Facility: CLINIC | Age: 62
End: 2022-09-30

## 2022-09-30 DIAGNOSIS — M81.0 AGE-RELATED OSTEOPOROSIS WITHOUT CURRENT PATHOLOGICAL FRACTURE: Primary | ICD-10-CM

## 2022-09-30 PROCEDURE — 96372 THER/PROPH/DIAG INJ SC/IM: CPT | Performed by: INTERNAL MEDICINE

## 2022-09-30 NOTE — PROGRESS NOTES
Patient arrived at clinic for 2nd evenity injections.   Patient stated she continues to experience joint aches in wrists and ankles and for first 2 weeks post first injections she experienced headaches - drinking caffeine and taking tylenol helped resolve headaches  Patient stated she would like to continue taking evenity injections  Patient given and tolerated 105mg evenity injections bilaterally to upper arms  Third evenity injections scheduled 10/31/22

## 2022-10-03 ENCOUNTER — APPOINTMENT (OUTPATIENT)
Dept: PHYSICAL THERAPY | Facility: HOSPITAL | Age: 62
End: 2022-10-03
Attending: PHYSICAL MEDICINE & REHABILITATION
Payer: MEDICAID

## 2022-10-03 ENCOUNTER — LAB ENCOUNTER (OUTPATIENT)
Dept: LAB | Facility: HOSPITAL | Age: 62
End: 2022-10-03
Attending: INTERNAL MEDICINE
Payer: MEDICAID

## 2022-10-03 ENCOUNTER — OFFICE VISIT (OUTPATIENT)
Dept: RHEUMATOLOGY | Facility: CLINIC | Age: 62
End: 2022-10-03
Payer: MEDICAID

## 2022-10-03 VITALS
HEART RATE: 65 BPM | DIASTOLIC BLOOD PRESSURE: 82 MMHG | WEIGHT: 127.81 LBS | BODY MASS INDEX: 20.54 KG/M2 | SYSTOLIC BLOOD PRESSURE: 121 MMHG | HEIGHT: 66 IN | RESPIRATION RATE: 16 BRPM

## 2022-10-03 DIAGNOSIS — M54.50 CHRONIC BILATERAL LOW BACK PAIN WITHOUT SCIATICA: ICD-10-CM

## 2022-10-03 DIAGNOSIS — G89.29 CHRONIC BILATERAL LOW BACK PAIN WITHOUT SCIATICA: ICD-10-CM

## 2022-10-03 DIAGNOSIS — M15.9 GENERALIZED OSTEOARTHRITIS: ICD-10-CM

## 2022-10-03 DIAGNOSIS — M81.0 AGE-RELATED OSTEOPOROSIS WITHOUT CURRENT PATHOLOGICAL FRACTURE: Primary | ICD-10-CM

## 2022-10-03 LAB — HEMOCCULT STL QL: NEGATIVE

## 2022-10-03 PROCEDURE — 99214 OFFICE O/P EST MOD 30 MIN: CPT | Performed by: INTERNAL MEDICINE

## 2022-10-03 PROCEDURE — 3079F DIAST BP 80-89 MM HG: CPT | Performed by: INTERNAL MEDICINE

## 2022-10-03 PROCEDURE — 3008F BODY MASS INDEX DOCD: CPT | Performed by: INTERNAL MEDICINE

## 2022-10-03 PROCEDURE — 3074F SYST BP LT 130 MM HG: CPT | Performed by: INTERNAL MEDICINE

## 2022-10-03 PROCEDURE — 82274 ASSAY TEST FOR BLOOD FECAL: CPT | Performed by: INTERNAL MEDICINE

## 2022-10-03 NOTE — PATIENT INSTRUCTIONS
1. Cont. Evenity shots from Endocrinology   2. Return to clinic in 10 -12 months   3. iburpofen 800mg every 8 hours as needed   4. tyelnol as needed.

## 2022-10-04 ENCOUNTER — APPOINTMENT (OUTPATIENT)
Dept: PHYSICAL THERAPY | Facility: HOSPITAL | Age: 62
End: 2022-10-04
Attending: PHYSICAL MEDICINE & REHABILITATION
Payer: MEDICAID

## 2022-10-13 ENCOUNTER — OFFICE VISIT (OUTPATIENT)
Dept: PHYSICAL THERAPY | Facility: HOSPITAL | Age: 62
End: 2022-10-13
Attending: PHYSICAL MEDICINE & REHABILITATION
Payer: MEDICAID

## 2022-10-13 ENCOUNTER — MED REC SCAN ONLY (OUTPATIENT)
Dept: INTERNAL MEDICINE CLINIC | Facility: CLINIC | Age: 62
End: 2022-10-13

## 2022-10-13 PROCEDURE — 97110 THERAPEUTIC EXERCISES: CPT | Performed by: PHYSICAL THERAPIST

## 2022-10-13 NOTE — PROGRESS NOTES
10/13/2022  DISCHARGE SUMMARY  Patient Name: Soo Jean  YOB: 1960          MRN number:  [id number]  Referring Physician:  Raheem Salinas    Dx:        Nontraumatic compression fracture of T6 vertebra, sequela (M48.54XS)  Facet arthropathy, lumbar (M47.816)  Authorized # of Visits:  10 visits on POC         Next MD visit: none   Fall Risk: standard         Precautions:  Severe osteoporosis with compression fractures  Medication Changes since last visit?: No    Subjective: Feels she was going well until her last injection for her bone health,  States she has been sore and achy all over her body since than. States her reaction was stronger this time than last time. States she has still been trying to walk and doing her supine activities but can't get through much of her HEP. Pain Ratin/10 VAS    Objective:     Cervical AROM:  Pain (+/-)   Flexion 40    Extension 35    R Sidebend NT    L Sidebend NT    R Rotation 70    L Rotation 70    Protrusion WFL    Retraction WFL          Shoulder AROM:      R    L   Flex 165        165   Abd 165 165   ER NT NT   IR NT NT   Hort abd limited limited       Strength UE:   5/5 MMT Scale   R  L   Shoulder flex  NT     NT   Shoulder ext NT NT   Abduction (C5) NT NT   ER NT NT   IR NT NT   Biceps (C6) NT NT   Wrist ext (C6) 5 5   Triceps (C7) 5 5   Wrist flex (C7) 5 5   EPL (C8)  5 5   Interossei (T1) 5 5      2022  Visit #10(8) 2022  Visit #11(9) 2022  Visit #12 (10) 2022  Visit #12(11) 10/13/2022  Visit #13(12)   Manual Therapy        Therapeutic Exercise Decompression activity    Adductor squeeze with exhale in sitting    Elbow press into the table    Shoulder flexion OH with core stabilization    Arm and leg lengthener  1. .unilaterall  2.  bilaterally Standing against the wall    \"w\" on the wall    Lat dorsi inhibition on the wall    Wall push ups  1. Triceps  2.   Chest    Ball walks up the wall    Wall plank with marching Decompression activities in supine hooklying    Arm and leg lengthener  1. Unilaterally  2. Bilaterally     Adductor squeeze with exhalation    Elbow push with abdominal brace           Wall push ups  1. Triceps  2. Chest    Wall planks    Lat dorsi inhibition on wall    Snow lyly on wall    Adductor squeeze with exhale    Review of HEP    Body mechanics training to avoid lumbar flexion Review of HEP    Re-assessment    Discussion of continuing walking program/plan for winter   Therapeutic Activity        Neuromuscular Education        TNE Education        HEP   Continue with current HEP Continue with current HEP        Assessment: Roberto Simons has completed 13 visits of PT and has progressed well. She was having minimal back pain until her recent osteoporosis injection. After which she experienced more all over body pain. She previously had returned to community based walking, social events in the community and was independent in posture and body mechanics principles to avoid bending/lifting/twisting. She was also very compliant with her HEP. Given her increased symptoms from the injections, recommend DC PT at this time. She may benefit from skilled PT in the further once the side effects of the medications are minimal.  The pt was advised to MyChart with questions. Goals: The pt was educated on the plan of care, purpose and individual goals for therapy, precautions for therapy. All questions were answered. 1.  The pt will be independent in their HEP. MET 9/1/2022  2. Centralization of symptoms to the cervical spine. MET 9/1/2022  3. The pt will be able to complete a modified workout program.  MET 10/13/2022  4. The pt will be able to walk 1 mile without an increase in pain. MET 10/13/2022  5. The pt will be able to stand and cook a light meal without an increase in pain. MET 9/1/2022  6. The pt will report a 50% decrease in pain. MET 10/13/2022  7.   The pt will be independent in lifting and body mechanics to avoid lumbar spine movement. MET 9/1/2022    Plan:  CHRIS PT at this time    Education or treatment limitation: None  Rehab Potential: good         Charges: TE3      Total Timed Treatment: 38 min  Total Treatment Time: 38 min      Patient was advised of these findings, precautions, and treatment options and has agreed to actively participate in planning and for this course of care. Thank you for your referral. Please co-sign or sign and return this letter via fax as soon as possible to [phone]. If you have any questions, please contact me at Dept: [phone]. Sincerely,  Rosa Fisher PT    Electronically signed by therapist: Rosa Fisher PT    [de-identified] certification required: Yes  I certify the need for these services furnished under this plan of treatment and while under my care.     X___________________________________________________ Date____________________    Certification From: 40/34/8831  To:1/11/2023

## 2022-10-18 ENCOUNTER — APPOINTMENT (OUTPATIENT)
Dept: PHYSICAL THERAPY | Facility: HOSPITAL | Age: 62
End: 2022-10-18
Attending: PHYSICAL MEDICINE & REHABILITATION
Payer: MEDICAID

## 2022-10-20 RX ORDER — LIDOCAINE AND PRILOCAINE 25; 25 MG/G; MG/G
CREAM TOPICAL ONCE
Status: CANCELLED | OUTPATIENT
Start: 2022-10-20 | End: 2022-10-20

## 2022-10-20 NOTE — TELEPHONE ENCOUNTER
I do not believe that pt was on this when she saw me last mn fr davis first time , noted she has apt with physiatry - can be discussed with them at that time with them

## 2022-10-20 NOTE — TELEPHONE ENCOUNTER
Received fax from [facility] pharmacy requesting medications pended as well as medications below    Diabetic Supply Kit  Ketoprofen 25 mg Capsules-take 2 tablets by mouth twice daily as directed for pain and inflammation     Future Appointments   Date Time Provider Janice Cross   10/31/2022  9:30 AM [facility] MINE FLORES RN [facility]ENDO [facility]   12/27/2022  8:00 AM Ailyn Covarrubias DO PM&R [facility]   8/7/2023  8:40 AM Geovany Daley MD 2014 [facility]

## 2022-10-25 ENCOUNTER — APPOINTMENT (OUTPATIENT)
Dept: PHYSICAL THERAPY | Facility: HOSPITAL | Age: 62
End: 2022-10-25
Attending: PHYSICAL MEDICINE & REHABILITATION
Payer: MEDICAID

## 2022-10-31 ENCOUNTER — NURSE ONLY (OUTPATIENT)
Dept: ENDOCRINOLOGY CLINIC | Facility: CLINIC | Age: 62
End: 2022-10-31
Payer: MEDICAID

## 2022-10-31 DIAGNOSIS — M81.0 AGE-RELATED OSTEOPOROSIS WITHOUT CURRENT PATHOLOGICAL FRACTURE: Primary | ICD-10-CM

## 2022-10-31 NOTE — PROGRESS NOTES
Patient presents for Evenity Injection. Tolerated well on left and right upper arm. Patient understands to return in 1 month for next injection.

## 2022-11-01 ENCOUNTER — PATIENT MESSAGE (OUTPATIENT)
Dept: ENDOCRINOLOGY CLINIC | Facility: CLINIC | Age: 62
End: 2022-11-01

## 2022-11-01 NOTE — TELEPHONE ENCOUNTER
From: Deangelo Haider  To: Kenji Melo MD  Sent: 11/1/2022 12:05 PM CDT  Subject: [place] Ki schedule     Hi this message is for the nurse, Marianna Chawla. I don't see my next EVENITY shot scheduled in my chart, I usually see it right away. I believe it is for December 2nd at 9:30?  Thank you, Farooq Boothe

## 2022-12-02 ENCOUNTER — NURSE ONLY (OUTPATIENT)
Dept: ENDOCRINOLOGY CLINIC | Facility: CLINIC | Age: 62
End: 2022-12-02
Payer: MEDICAID

## 2022-12-02 DIAGNOSIS — M81.0 AGE-RELATED OSTEOPOROSIS WITHOUT CURRENT PATHOLOGICAL FRACTURE: Primary | ICD-10-CM

## 2022-12-02 DIAGNOSIS — E55.9 VITAMIN D DEFICIENCY: ICD-10-CM

## 2022-12-02 PROCEDURE — 96372 THER/PROPH/DIAG INJ SC/IM: CPT | Performed by: INTERNAL MEDICINE

## 2022-12-02 NOTE — PROGRESS NOTES
Patient arrived at clinic for 4th evenity injection - patient given and tolerated 105mg evenity bilaterally to upper arms.     Patient stated understanding to return for 5th injection in one month - injection scheduled 1/3/23  Patient scheduled f/u with Dr. Sophy Morris and 6th evenity injection on 2/6/23 - patient stated understandign to complete labs prior to f/u with Dr. Sophy Morris - labs ordered per LOV dtd 8/9/22

## 2022-12-05 NOTE — TELEPHONE ENCOUNTER
Pt is using a new pharmacy and they are asking for several of her scripts (new Rx)    New item not on current list of medications: need all necessary information to send Rx     Supply Kit     Strips  meter  Lamcets  Alcohol pads    Insulin Supply Kit  Syringes  Pen needles  Alcohol pads    Diclofenac 1% Topical Gel, 1400 grams / 88-day supply  SIG Apply 2-4 grams topically to affected area 3-4 times  a day for pain and inflamation    Ketoprofen 25mg capsules  120 capsules 30 day supply, Refills 5  Sig Take 2 tablets by mouth twice daily as directed for pain and inflamation      EMLA cream (lidocaine 2.5% / Prilocaine 2.5%)  360 grams / 30 day supply  Sig: apply 2-3 grams topically to affected area 3-4 times per day (do not exceed more than 12 grams in a day)

## 2022-12-06 RX ORDER — BLOOD SUGAR DIAGNOSTIC
STRIP MISCELLANEOUS
Refills: 0 | Status: CANCELLED | OUTPATIENT
Start: 2022-12-06

## 2022-12-06 RX ORDER — PEN NEEDLE, DIABETIC 31 GX5/16"
NEEDLE, DISPOSABLE MISCELLANEOUS
Refills: 0 | Status: CANCELLED | OUTPATIENT
Start: 2022-12-06

## 2022-12-06 RX ORDER — KETOPROFEN 25 MG/1
2 CAPSULE ORAL 2 TIMES DAILY
Qty: 360 CAPSULE | Refills: 1 | Status: CANCELLED | OUTPATIENT
Start: 2022-12-06 | End: 2023-01-05

## 2022-12-06 RX ORDER — LANCETS 33 GAUGE
EACH MISCELLANEOUS
Refills: 0 | Status: CANCELLED | OUTPATIENT
Start: 2022-12-06

## 2022-12-06 RX ORDER — LIDOCAINE AND PRILOCAINE 25; 25 MG/G; MG/G
CREAM TOPICAL
Qty: 1080 G | Refills: 1 | Status: CANCELLED | OUTPATIENT
Start: 2022-12-06

## 2022-12-06 RX ORDER — PEN NEEDLE, DIABETIC 33 GX5/32"
NEEDLE, DISPOSABLE MISCELLANEOUS
Refills: 0 | Status: CANCELLED | OUTPATIENT
Start: 2022-12-06

## 2022-12-06 NOTE — TELEPHONE ENCOUNTER
Please review. Protocol failed/ No protocol      Requested Prescriptions   Pending Prescriptions Disp Refills    lidocaine-prilocaine 2.5-2.5 % External Cream 1080 g 1     Si-3 grams topically daily 3-4 times per day, not to exceed 12 grams per day       Non-Narcotic Pain Medication Protocol Passed - 2022 12:30 PM        Passed - In person appointment or virtual visit in the past 6 mos or appointment in next 3 mos     Recent Outpatient Visits              4 days ago Age-related osteoporosis without current pathological fracture    [facility], [address] [place] [address], [employer]    Nurse Only    1 month ago Age-related osteoporosis without current pathological fracture    [facility], [address] [place] [address], [address] Only    1 month ago     [address] [place], [place]    Office Visit    2 months ago Age-related osteoporosis without current pathological fracture    [facility], [place], Leny Zultea MD    Office Visit    2 months ago Age-related osteoporosis without current pathological fracture    [facility] Endocrinology    Nurse Only          Future Appointments         Provider Department Appt Notes    In 3 weeks Neelima Pérez, 4[address] [facility], [place]-Physiatry 3M FU    In 4 weeks 43[facility], [facility] 84 evenity #5    In 2 months Tre Hardy MD [facility], [facility] 84 osteoporosis f/u and evenity #6    In 8 months Prashanth Crockett MD [facility], [place], [place] 10 mo f/u                 Ketoprofen 25 MG Oral Cap 360 capsule 1     Sig: Take 2 tablets by mouth 2 (two) times a day.        Non-Narcotic Pain Medication Protocol Passed - 2022 12:30 PM        Passed - In person appointment or virtual visit in the past 6 mos or appointment in next 3 mos     Recent Outpatient Visits              4 days ago Age-related osteoporosis without current pathological fracture    [address] [place] Usama Melgozad, [address] [facility], [address]    Nurse Only    1 month ago Age-related osteoporosis without current pathological fracture    [address] [place] Usama Cobos, [address] [facility], [address] Only    1 month ago     [address] [place], [place]    Office Visit    2 months ago Age-related osteoporosis without current pathological fracture    [facility], [address], Chuyita Rose MD    Office Visit    2 months ago Age-related osteoporosis without current pathological fracture    [address] [place] Us[address]vard Endocrinology    Nurse Only          Future Appointments         Provider Department Appt Notes    In 3 weeks oCny Rao, 4200 [facility], [place]-Physiatry 3M FU    In 4 weeks 4320 [facility], [address] [facility], [place] evenity #5    In 2 months Golden Baxter MD [facility], 801 [facility] osteoporosis f/u and evenity #6    In 8 months Brendon Fragoso, 410 [facility], [address], [place] 10 mo f/u                 diclofenac 1 % External Gel 1400 g 1     Si-4 grams topically daily 3-4 times per day for pain/inflammation       There is no refill protocol information for this order       Alcohol Swabs (ALCOHOL PREP) Does not apply Pads  0       There is no refill protocol information for this order       Insulin Pen Needle (PEN NEEDLES) 33G X 4 MM Does not apply Misc  0       Diabetic Supplies Protocol Passed - 2022 12:30 PM        Passed - In person appointment or virtual visit in the past 12 mos or appointment in next 3 mos     Recent Outpatient Visits              4 days ago Age-related osteoporosis without current pathological fracture    [address] [place] Usama Melgozad, [address] [facility], [address]    Nurse Only    1 month ago Age-related osteoporosis without current pathological fracture    [place] [facility], 602 [facility]    Nurse Only    1 month ago     [address] [place], [place]    Office Visit    2 months ago Age-related osteoporosis without current pathological fracture    [facility], [address], Jenna Min MD    Office Visit    2 months ago Age-related osteoporosis without current pathological fracture    [facility], [facility] Endocrinology    Nurse Only          Future Appointments         Provider Department Appt Notes    In 3 weeks Coco Chavez, 203 [facility], [place]-Physiatry 3M FU    In 4 weeks 43[address] [facility], 602 [facility], [place] evenity #5    In 2 months Clayton Kim MD [facility], [facility], 801 [facility] osteoporosis f/u and evenity #6    In 8 months Raúl, 1108 [facility], [address] [place] [address], [address], [place] 10 mo f/u                 Lancets 33G Does not apply Misc  0       Diabetic Supplies Protocol Passed - 12/5/2022 12:30 PM        Passed - In person appointment or virtual visit in the past 12 mos or appointment in next 3 mos     Recent Outpatient Visits              4 days ago Age-related osteoporosis without current pathological fracture    [facility], [facility], [address] [place] [address]    Nurse Only    1 month ago Age-related osteoporosis without current pathological fracture    [facility], [facility], [address] [place] [address]    Nurse Only    1 month ago     [address] [facility]    Office Visit    2 months ago Age-related osteoporosis without current pathological fracture    [facility], [address], Jenna Min MD    Office Visit    2 months ago Age-related osteoporosis without current pathological fracture    [facility] [place], [facility] Endocrinology    Nurse Only          Future Appointments         Provider Department Appt Notes    In 3 weeks DO Xin Hill [address] [place] [address], [place]-Physiatry 3M FU    In 4 weeks [address], [address] evenity #5    In 2 months Mago Feliciano MD [facility], [facility], [address] osteoporosis f/u and evenity #6    In 8 months Carmela Corrales MD [facility], [address], [place] 10 mo f/u                 Blood Glucose Monitoring Suppl (D-CARE GLUCOMETER) w/Device Does not apply Kit  0       Diabetic Supplies Protocol Passed - 12/5/2022 12:30 PM        Passed - In person appointment or virtual visit in the past 12 mos or appointment in next 3 mos     Recent Outpatient Visits              4 days ago Age-related osteoporosis without current pathological fracture    [facility], [address] [place] [address], [place]    Nurse Only    1 month ago Age-related osteoporosis without current pathological fracture    [facility], [address] [place] [address], [address] Only    1 month ago     [address] [place] [address]    Office Visit    2 months ago Age-related osteoporosis without current pathological fracture    [facility], [address], Dameon Nogueira MD    Office Visit    2 months ago Age-related osteoporosis without current pathological fracture    [facility] Endocrinology    Nurse Only          Future Appointments         Provider Department Appt Notes    In 3 weeks Ashli Mccallum, 4200 [facility], [place]-Physiatry 3M FU    In 4 weeks [address], [address] evenity #5    In 2 months Mago Feliciano MD [facility], [facility], [address] osteoporosis f/u and evenity #6    In 8 months Carmela Corrales MD [facility], [address], [place] 10 mo f/u                 Glucose Blood (DUO-CARE TEST) In Vitro Strip  0       Diabetic Supplies Protocol Passed - 12/5/2022 12:30 PM Passed - In person appointment or virtual visit in the past 12 mos or appointment in next 3 mos     Recent Outpatient Visits              4 days ago Age-related osteoporosis without current pathological fracture    [facility], [address] [place] [address], [place]    Nurse Only    1 month ago Age-related osteoporosis without current pathological fracture    [facility], [address] [place] [address], [address] Only    1 month ago     [address] [place], [place]    Office Visit    2 months ago Age-related osteoporosis without current pathological fracture    [facility], [address] [facility] [address], Jenna Min MD    Office Visit    2 months ago Age-related osteoporosis without current pathological fracture    [facility] Endocrinology    Nurse Only          Future Appointments         Provider Department Appt Notes    In 3 weeks Coco Chavez, 203 [facility], [place]-Physiatry 3M FU    In 4 weeks 4320 [facility], 801 [facility] evenity #5    In 2 months Clayton Kim MD [facility], [facility] osteoporosis f/u and evenity #6    In 8 months Raúl, 1108 [facility], [address] [facility], [address], [place] 10 mo f/u                 Insulin Syringes, Disposable, U-100 0.3 ML Does not apply Misc  0       Diabetic Supplies Protocol Passed - 12/5/2022 12:30 PM        Passed - In person appointment or virtual visit in the past 12 mos or appointment in next 3 mos     Recent Outpatient Visits              4 days ago Age-related osteoporosis without current pathological fracture    [facility], [address] [place] [address], [employer]    Nurse Only    1 month ago Age-related osteoporosis without current pathological fracture    [facility], [address] [place] [address], [address] Only    1 month ago     [address] [place], [place]    Office Visit    2 months ago Age-related osteoporosis without current pathological fracture    [facility], [address], Maria Esther Medina MD    Office Visit    2 months ago Age-related osteoporosis without current pathological fracture    [facility] Endocr[facility]ology    Nurse Only          Future Appointments         Provider Department Appt Notes    In 3 weeks Franck Mills, [address] [facility], [place]-Physiatry 3M FU    In 4 weeks 4320 [facility], 602 [facility], [place] evenity #5    In 2 months Winnie Mnedez MD [facility], [address] osteoporosis f/u and evenity #6    In 8 months [facility], 410 [facility], [address], [place] 10 mo f/u                    Future Appointments         Provider Department Appt Notes    In 3 weeks Franck Mills, [address] [facility] [employer], [place]-Physiatry 3M FU    In 4 weeks 4320 [facility], [place] [address] evenity #5    In 2 months Winnie Mendez MD [email or web address], [address] osteoporosis f/u and evenity #6    In 8 months [facility], 410 [facility], [address], [place] 10 mo f/u             Recent Outpatient Visits              4 days ago Age-related osteoporosis without current pathological fracture    [facility], [address] [place] [address], [address]    Nurse Only    1 month ago Age-related osteoporosis without current pathological fracture    [facility], [address] [place] [address], [address] Only    1 month ago     [address] [facility], [place]    Office Visit    2 months ago Age-related osteoporosis without current pathological fracture    [facility] [place] Maria Esther Gr MD    Office Visit    2 months ago Age-related osteoporosis without current pathological fracture    [facility] Endocrinology    Nurse Only

## 2022-12-07 NOTE — TELEPHONE ENCOUNTER
When looking at this is doesn't look like anything is needing refills , pls pend anyghing that needs to be refilled with the correct pharm   The diabetic supplies she can get from endo but I dont have she is diabetic in my notes   I have only seen her once in sept   She can come for her 3 mn f/u apt - pls assist with apt

## 2022-12-14 ENCOUNTER — TELEPHONE (OUTPATIENT)
Dept: INTERNAL MEDICINE CLINIC | Facility: CLINIC | Age: 62
End: 2022-12-14

## 2022-12-14 ENCOUNTER — TELEPHONE (OUTPATIENT)
Dept: ENDOCRINOLOGY CLINIC | Facility: CLINIC | Age: 62
End: 2022-12-14

## 2022-12-14 ENCOUNTER — PATIENT MESSAGE (OUTPATIENT)
Dept: PHYSICAL MEDICINE AND REHAB | Facility: CLINIC | Age: 62
End: 2022-12-14

## 2022-12-14 NOTE — TELEPHONE ENCOUNTER
This patient is scheduled for [facility] 1/3/23. There is a valid authorization on file (TE 8/9/22) until 8/17/23. However, since it will be a new year, it's best to find out if patient will have the same insurance. Otherwise, won't we need to do another insurance verification for 2023?

## 2022-12-14 NOTE — TELEPHONE ENCOUNTER
Requested but not on Current List of meds:      Diabetic Supply Kit:  Strips, Meter, Lancets, Lancing Device and alcohol pads. All information required for processing. Testing frequency 1, 2, 3, 4 other  Insulin treated:  Yes - No    Insulin Supply Kit - what is needed?     Syringes  Pen Needles  Alcohol pads  Frequency per day: 1, 2, 3, 4, other  Diagnosis ICD-10 or ?  E10.44, E10.9, E11.65, E11.0 - Other

## 2022-12-14 NOTE — TELEPHONE ENCOUNTER
New Rx for items not on Current List       Diclofenac 1% Topical Get - 1400 grams - 88 - day supply  Sig: Apply 2-4 grams topically to affected area 3-4 times per day for pain and inflamation. Ketoprofen 25mg capsules - 120 capsules - 30 day supply - 5 refills  Sig: Take 2 tablets by mouth twice daily as directed for pain and inflammation    EMLA Cream (Lidocaine 2.5% / Prilocaine 2.5% - 360 grams - 30 day supply  Sig: Apply 2-3 grams topically to affected area 3-4 times per day (do NOT exceed more than 12 grams in a day.

## 2022-12-14 NOTE — TELEPHONE ENCOUNTER
Patient sees Dr. Pola Garcia for osteoporosis and vitamin D deficiency  Spoke to patient - she stated she is not diabetic and does not use any diabetic supplies  Patient is NOT using [facility]

## 2022-12-15 NOTE — TELEPHONE ENCOUNTER
From: Ephriam Crigler  To: Hema Zayas.  Meenakshi Parker DO  Sent: 12/14/2022 2:20 PM CST  Subject: Appointment changed    Just making sure you see that I changed my appointment on December 27th, from 8:00 am to 11:20 am. Thanks, Davin Montero

## 2022-12-27 ENCOUNTER — OFFICE VISIT (OUTPATIENT)
Dept: PHYSICAL MEDICINE AND REHAB | Facility: CLINIC | Age: 62
End: 2022-12-27
Payer: MEDICAID

## 2022-12-27 ENCOUNTER — TELEPHONE (OUTPATIENT)
Dept: PHYSICAL MEDICINE AND REHAB | Facility: CLINIC | Age: 62
End: 2022-12-27

## 2022-12-27 VITALS — OXYGEN SATURATION: 98 % | HEART RATE: 75 BPM | WEIGHT: 127 LBS | BODY MASS INDEX: 20.41 KG/M2 | HEIGHT: 66 IN

## 2022-12-27 DIAGNOSIS — M48.54XS NONTRAUMATIC COMPRESSION FRACTURE OF T6 VERTEBRA, SEQUELA: ICD-10-CM

## 2022-12-27 DIAGNOSIS — M47.816 FACET ARTHROPATHY, LUMBAR: Primary | ICD-10-CM

## 2022-12-27 PROCEDURE — 99214 OFFICE O/P EST MOD 30 MIN: CPT | Performed by: PHYSICAL MEDICINE & REHABILITATION

## 2022-12-27 PROCEDURE — 3008F BODY MASS INDEX DOCD: CPT | Performed by: PHYSICAL MEDICINE & REHABILITATION

## 2022-12-27 RX ORDER — DIAZEPAM 10 MG/1
TABLET ORAL
Qty: 1 TABLET | Refills: 0 | Status: SHIPPED | OUTPATIENT
Start: 2022-12-27

## 2022-12-27 NOTE — TELEPHONE ENCOUNTER
Patient has been scheduled for Bilateral L4-5, L5-S1 Facet joint injection under fluoroscopy guidance  on 12/30/22 at the [facility] with Dr. Donna Covarrubias.   -Anesthesia type: Local.  -Patient informed to fast 12 hours prior to procedure with IVCS/MAC.   -Scheduling [facility] covid testing required for all procedures whether patient is vaccinated or not. -Patient was advised that if he/she does receive the covid vaccine it needs to be at least 2 weeks before or after the injection. -Medications and allergies reviewed. -Patient reminded to hold NSAIDs (Ibuprofen, ASA 81, Aleve, Naproxen, Mobic, Diclofenac, Etodolac, Celebrex etc.) for 3 days prior to Lumbar MBB/Facet if BMI is greater than 35. For Cervical injections only hold multivitamins, Vitamin E, Fish Oil, Phentermine/Lomaira for 7 days prior to injection and NSAIDS.  mg to be held for 7 days prior to injections.  -If patient is receiving MAC/IVCS Phentermine Tre Enter) will need to be held for 7 days prior to injection.  -If on blood thinner clearance has been received to hold this medication by provider.   -Patient informed he/she will need a  to and from procedure. Rosalia Epstein is located in the [facility] [address] 1st floor,  may park in the yellow/purple parking lot.     Patient verbalized understanding and agrees with plan.  -----> Scheduled in Epic: Yes  -----> Scheduled in [employer]: No surgical case sent

## 2022-12-27 NOTE — PATIENT INSTRUCTIONS
-Valium before injection  -My office will call once injection is approved  -Follow up 2 weeks after injection

## 2022-12-27 NOTE — TELEPHONE ENCOUNTER
Initiated authorization for Bilateral L3-4, L4-5, L5-S1 Facet joint injection under fluoroscopy guidance CPT [id number] dx:M47.816 to be done at [facility] with [employer]  Status: Approved w/ authorization #[id number] valid 12/27/22-2/25/2023    Per Shilpi Napier to two levels, either unilateral or bilateral, are allowed per session per spine region. The need for a three or four-level procedure bilaterally may be considered under unique circumstances and with sufficient documentation of medical necessity on appeal. A session is a time period, which includes all procedures (i.e., medial branch block (MBB), intraarticular injections (IA), facet cyst ruptures, and RFA ablations that are performed during the same day. Frequency Limitation: For each covered spinal region no more than two (2) radiofrequency sessions will be reimbursed per rolling 12 months.

## 2023-01-03 ENCOUNTER — NURSE ONLY (OUTPATIENT)
Dept: ENDOCRINOLOGY CLINIC | Facility: CLINIC | Age: 63
End: 2023-01-03
Payer: MEDICAID

## 2023-01-03 DIAGNOSIS — M81.0 AGE-RELATED OSTEOPOROSIS WITHOUT CURRENT PATHOLOGICAL FRACTURE: Primary | ICD-10-CM

## 2023-01-03 PROCEDURE — 96372 THER/PROPH/DIAG INJ SC/IM: CPT | Performed by: INTERNAL MEDICINE

## 2023-01-03 NOTE — PROGRESS NOTES
Patient presents for Evenity Injection, tolerated well on left and right upper arm. Patient understands to return in 1 month for next injection with repeat lab work.

## 2023-01-26 ENCOUNTER — HOSPITAL ENCOUNTER (OUTPATIENT)
Dept: LAB | Age: 63
Discharge: HOME | End: 2023-01-26
Attending: INTERNAL MEDICINE

## 2023-01-26 DIAGNOSIS — R14.0 ABDOMINAL DISTENSION (GASEOUS): Primary | ICD-10-CM

## 2023-01-26 DIAGNOSIS — R10.13 EPIGASTRIC PAIN: ICD-10-CM

## 2023-01-26 LAB
ALBUMIN SERPL-MCNC: 4.1 G/DL (ref 3.6–5.1)
ALBUMIN/GLOB SERPL: 1.2 {RATIO} (ref 1–2.4)
ALP SERPL-CCNC: 99 UNITS/L (ref 45–117)
ALT SERPL-CCNC: 16 UNITS/L
ANION GAP SERPL CALC-SCNC: 10 MMOL/L (ref 7–19)
AST SERPL-CCNC: 17 UNITS/L
BILIRUB SERPL-MCNC: 0.4 MG/DL (ref 0.2–1)
BUN SERPL-MCNC: 9 MG/DL (ref 6–20)
BUN/CREAT SERPL: 15 (ref 7–25)
CALCIUM SERPL-MCNC: 9.5 MG/DL (ref 8.4–10.2)
CHLORIDE SERPL-SCNC: 104 MMOL/L (ref 97–110)
CO2 SERPL-SCNC: 29 MMOL/L (ref 21–32)
CREAT SERPL-MCNC: 0.59 MG/DL (ref 0.51–0.95)
DEPRECATED RDW RBC: 44.7 FL (ref 39–50)
ERYTHROCYTE [DISTWIDTH] IN BLOOD: 12.5 % (ref 11–15)
FASTING DURATION TIME PATIENT: NORMAL H
GFR SERPLBLD BASED ON 1.73 SQ M-ARVRAT: >90 ML/MIN
GLOBULIN SER-MCNC: 3.5 G/DL (ref 2–4)
GLUCOSE SERPL-MCNC: 89 MG/DL (ref 70–99)
HCT VFR BLD CALC: 41.3 % (ref 36–46.5)
HGB BLD-MCNC: 13.3 G/DL (ref 12–15.5)
MCH RBC QN AUTO: 31.6 PG (ref 26–34)
MCHC RBC AUTO-ENTMCNC: 32.2 G/DL (ref 32–36.5)
MCV RBC AUTO: 98.1 FL (ref 78–100)
NRBC BLD MANUAL-RTO: 0 /100 WBC
PLATELET # BLD AUTO: 433 K/MCL (ref 140–450)
POTASSIUM SERPL-SCNC: 4.4 MMOL/L (ref 3.4–5.1)
PROT SERPL-MCNC: 7.6 G/DL (ref 6.4–8.2)
RBC # BLD: 4.21 MIL/MCL (ref 4–5.2)
SODIUM SERPL-SCNC: 139 MMOL/L (ref 135–145)
WBC # BLD: 7.6 K/MCL (ref 4.2–11)

## 2023-01-26 PROCEDURE — 36415 COLL VENOUS BLD VENIPUNCTURE: CPT | Performed by: CLINICAL MEDICAL LABORATORY

## 2023-01-26 PROCEDURE — 80053 COMPREHEN METABOLIC PANEL: CPT | Performed by: CLINICAL MEDICAL LABORATORY

## 2023-01-26 PROCEDURE — 85027 COMPLETE CBC AUTOMATED: CPT | Performed by: CLINICAL MEDICAL LABORATORY

## 2023-01-27 ENCOUNTER — MED REC SCAN ONLY (OUTPATIENT)
Dept: INTERNAL MEDICINE CLINIC | Facility: CLINIC | Age: 63
End: 2023-01-27

## 2023-01-30 ENCOUNTER — LAB ENCOUNTER (OUTPATIENT)
Dept: LAB | Facility: HOSPITAL | Age: 63
End: 2023-01-30
Attending: INTERNAL MEDICINE
Payer: MEDICAID

## 2023-01-30 DIAGNOSIS — M81.0 SENILE OSTEOPOROSIS: Primary | ICD-10-CM

## 2023-01-30 LAB
ALBUMIN SERPL-MCNC: 4.2 G/DL (ref 3.4–5)
ALBUMIN/GLOB SERPL: 1 {RATIO} (ref 1–2)
ALP LIVER SERPL-CCNC: 110 U/L
ALT SERPL-CCNC: 17 U/L
ANION GAP SERPL CALC-SCNC: 8 MMOL/L (ref 0–18)
AST SERPL-CCNC: 16 U/L (ref 15–37)
BILIRUB SERPL-MCNC: 0.6 MG/DL (ref 0.1–2)
BUN BLD-MCNC: 10 MG/DL (ref 7–18)
BUN/CREAT SERPL: 13.9 (ref 10–20)
CALCIUM BLD-MCNC: 9.6 MG/DL (ref 8.5–10.1)
CHLORIDE SERPL-SCNC: 102 MMOL/L (ref 98–112)
CO2 SERPL-SCNC: 29 MMOL/L (ref 21–32)
CREAT BLD-MCNC: 0.72 MG/DL
FASTING STATUS PATIENT QL REPORTED: YES
GFR SERPLBLD BASED ON 1.73 SQ M-ARVRAT: 94 ML/MIN/1.73M2 (ref 60–?)
GLOBULIN PLAS-MCNC: 4.1 G/DL (ref 2.8–4.4)
GLUCOSE BLD-MCNC: 83 MG/DL (ref 70–99)
OSMOLALITY SERPL CALC.SUM OF ELEC: 286 MOSM/KG (ref 275–295)
POTASSIUM SERPL-SCNC: 4 MMOL/L (ref 3.5–5.1)
PROT SERPL-MCNC: 8.3 G/DL (ref 6.4–8.2)
PTH-INTACT SERPL-MCNC: 76 PG/ML (ref 18.5–88)
PTH-INTACT SERPL-MCNC: 84.7 PG/ML (ref 18.5–88)
SODIUM SERPL-SCNC: 139 MMOL/L (ref 136–145)
VIT D+METAB SERPL-MCNC: 30.4 NG/ML (ref 30–100)

## 2023-01-30 PROCEDURE — 83970 ASSAY OF PARATHORMONE: CPT | Performed by: INTERNAL MEDICINE

## 2023-01-30 PROCEDURE — 36415 COLL VENOUS BLD VENIPUNCTURE: CPT | Performed by: INTERNAL MEDICINE

## 2023-01-30 PROCEDURE — 80053 COMPREHEN METABOLIC PANEL: CPT | Performed by: INTERNAL MEDICINE

## 2023-01-30 PROCEDURE — 84080 ASSAY ALKALINE PHOSPHATASES: CPT

## 2023-01-30 PROCEDURE — 82306 VITAMIN D 25 HYDROXY: CPT | Performed by: INTERNAL MEDICINE

## 2023-01-31 LAB — BONE SPECIFIC ALKALINE PHOSPHATASE: 17.3 UG/L

## 2023-02-03 ENCOUNTER — TELEPHONE (OUTPATIENT)
Dept: INTERNAL MEDICINE CLINIC | Facility: CLINIC | Age: 63
End: 2023-02-03

## 2023-02-06 ENCOUNTER — OFFICE VISIT (OUTPATIENT)
Dept: ENDOCRINOLOGY CLINIC | Facility: CLINIC | Age: 63
End: 2023-02-06

## 2023-02-06 VITALS
WEIGHT: 128 LBS | BODY MASS INDEX: 21 KG/M2 | HEART RATE: 73 BPM | SYSTOLIC BLOOD PRESSURE: 129 MMHG | DIASTOLIC BLOOD PRESSURE: 83 MMHG

## 2023-02-06 DIAGNOSIS — M81.0 AGE-RELATED OSTEOPOROSIS WITHOUT CURRENT PATHOLOGICAL FRACTURE: Primary | ICD-10-CM

## 2023-02-06 NOTE — PROGRESS NOTES
Patient presents for Evenity Injection. Tolerated well on left and right upper arm. Patient has multiple insurances on file. RN looked up patient's SOB--patient currently has authorization on file for Evenity 2/2099---DDSOINB unsure which policy ID this is under. RN resubmitted benefit information on ticckle which is currently in process. Patient was insistent on getting injection today. Advised patient to call us if she receives a bill for injection.

## 2023-03-01 ENCOUNTER — OFFICE VISIT (OUTPATIENT)
Dept: INTERNAL MEDICINE CLINIC | Facility: CLINIC | Age: 63
End: 2023-03-01

## 2023-03-01 VITALS
SYSTOLIC BLOOD PRESSURE: 102 MMHG | WEIGHT: 127.81 LBS | BODY MASS INDEX: 20.54 KG/M2 | HEIGHT: 66 IN | DIASTOLIC BLOOD PRESSURE: 70 MMHG | OXYGEN SATURATION: 99 % | HEART RATE: 73 BPM | RESPIRATION RATE: 16 BRPM

## 2023-03-01 DIAGNOSIS — M47.816 OSTEOARTHRITIS OF LUMBAR SPINE, UNSPECIFIED SPINAL OSTEOARTHRITIS COMPLICATION STATUS: ICD-10-CM

## 2023-03-01 DIAGNOSIS — M54.31 SCIATICA OF RIGHT SIDE: ICD-10-CM

## 2023-03-01 DIAGNOSIS — Z12.31 ENCOUNTER FOR SCREENING MAMMOGRAM FOR MALIGNANT NEOPLASM OF BREAST: ICD-10-CM

## 2023-03-01 DIAGNOSIS — M80.00XD AGE-RELATED OSTEOPOROSIS WITH CURRENT PATHOLOGICAL FRACTURE WITH ROUTINE HEALING: ICD-10-CM

## 2023-03-01 DIAGNOSIS — K21.9 CHRONIC GERD: Primary | ICD-10-CM

## 2023-03-01 PROCEDURE — 3078F DIAST BP <80 MM HG: CPT | Performed by: INTERNAL MEDICINE

## 2023-03-01 PROCEDURE — 99214 OFFICE O/P EST MOD 30 MIN: CPT | Performed by: INTERNAL MEDICINE

## 2023-03-01 PROCEDURE — 3074F SYST BP LT 130 MM HG: CPT | Performed by: INTERNAL MEDICINE

## 2023-03-01 PROCEDURE — 3008F BODY MASS INDEX DOCD: CPT | Performed by: INTERNAL MEDICINE

## 2023-03-02 ENCOUNTER — HOSPITAL ENCOUNTER (OUTPATIENT)
Dept: ULTRASOUND IMAGING | Age: 63
Discharge: HOME | End: 2023-03-02
Attending: INTERNAL MEDICINE

## 2023-03-02 DIAGNOSIS — R10.9 UNSPECIFIED ABDOMINAL PAIN: ICD-10-CM

## 2023-03-02 DIAGNOSIS — K21.9 GASTRO-ESOPHAGEAL REFLUX DISEASE WITHOUT ESOPHAGITIS: ICD-10-CM

## 2023-03-02 DIAGNOSIS — R10.13 EPIGASTRIC DISCOMFORT: ICD-10-CM

## 2023-03-02 DIAGNOSIS — Z86.010 HISTORY OF COLON POLYPS: ICD-10-CM

## 2023-03-02 DIAGNOSIS — K21.00 GERD WITH ESOPHAGITIS: ICD-10-CM

## 2023-03-02 DIAGNOSIS — R14.0 BLOATING: ICD-10-CM

## 2023-03-02 PROCEDURE — 76700 US EXAM ABDOM COMPLETE: CPT

## 2023-03-06 ENCOUNTER — TELEPHONE (OUTPATIENT)
Dept: ENDOCRINOLOGY CLINIC | Facility: CLINIC | Age: 63
End: 2023-03-06

## 2023-03-06 NOTE — TELEPHONE ENCOUNTER
Pt is scheduled to see a nurse tomorrow for an injection. Pt is requesting to come in earlier in the morning.    Please call

## 2023-03-07 ENCOUNTER — NURSE ONLY (OUTPATIENT)
Dept: ENDOCRINOLOGY CLINIC | Facility: CLINIC | Age: 63
End: 2023-03-07

## 2023-03-07 DIAGNOSIS — M81.0 AGE-RELATED OSTEOPOROSIS WITHOUT CURRENT PATHOLOGICAL FRACTURE: Primary | ICD-10-CM

## 2023-03-07 PROCEDURE — 96372 THER/PROPH/DIAG INJ SC/IM: CPT | Performed by: INTERNAL MEDICINE

## 2023-03-07 NOTE — PROGRESS NOTES
Patient presents for her 7th Evenity injection. This RN administered a total of 210 mg to bilateral upper arm (105 mg at each arm) and tolerated it well. Patient is scheduled as below for her 8th injection.      Future Appointments   Date Time Provider Janice Cross   4/10/2023  9:30 AM [facility] MINE FLORES RN DOUGLAS [facility]

## 2023-03-16 ENCOUNTER — TELEPHONE (OUTPATIENT)
Dept: ENDOCRINOLOGY CLINIC | Facility: CLINIC | Age: 63
End: 2023-03-16

## 2023-03-16 ENCOUNTER — HOSPITAL ENCOUNTER (OUTPATIENT)
Dept: GENERAL RADIOLOGY | Facility: HOSPITAL | Age: 63
Discharge: HOME | End: 2023-03-16
Attending: INTERNAL MEDICINE
Payer: MEDICAID

## 2023-03-16 DIAGNOSIS — M47.816 OSTEOARTHRITIS OF LUMBAR SPINE, UNSPECIFIED SPINAL OSTEOARTHRITIS COMPLICATION STATUS: ICD-10-CM

## 2023-03-16 PROCEDURE — 72110 X-RAY EXAM L-2 SPINE 4/>VWS: CPT | Performed by: INTERNAL MEDICINE

## 2023-03-16 NOTE — TELEPHONE ENCOUNTER
Received from [address] patient's Disability Form from [address], Disability Determination. Emailed form to Forms Department and sent original via inter-office mail to Forms Department.

## 2023-03-17 ENCOUNTER — TELEPHONE (OUTPATIENT)
Dept: NEUROLOGY | Facility: CLINIC | Age: 63
End: 2023-03-17

## 2023-03-22 ENCOUNTER — OFFICE VISIT (OUTPATIENT)
Dept: PHYSICAL MEDICINE AND REHAB | Facility: CLINIC | Age: 63
End: 2023-03-22
Payer: MEDICAID

## 2023-03-22 VITALS
HEART RATE: 66 BPM | WEIGHT: 127 LBS | DIASTOLIC BLOOD PRESSURE: 62 MMHG | HEIGHT: 66 IN | BODY MASS INDEX: 20.41 KG/M2 | OXYGEN SATURATION: 91 % | SYSTOLIC BLOOD PRESSURE: 98 MMHG

## 2023-03-22 DIAGNOSIS — M54.16 RIGHT LUMBAR RADICULOPATHY: Primary | ICD-10-CM

## 2023-03-22 PROCEDURE — 99214 OFFICE O/P EST MOD 30 MIN: CPT | Performed by: PHYSICAL MEDICINE & REHABILITATION

## 2023-03-22 PROCEDURE — 3008F BODY MASS INDEX DOCD: CPT | Performed by: PHYSICAL MEDICINE & REHABILITATION

## 2023-03-22 PROCEDURE — 3074F SYST BP LT 130 MM HG: CPT | Performed by: PHYSICAL MEDICINE & REHABILITATION

## 2023-03-22 PROCEDURE — 3078F DIAST BP <80 MM HG: CPT | Performed by: PHYSICAL MEDICINE & REHABILITATION

## 2023-03-22 RX ORDER — PREGABALIN 50 MG/1
50 CAPSULE ORAL 2 TIMES DAILY
Qty: 60 CAPSULE | Refills: 0 | Status: SHIPPED | OUTPATIENT
Start: 2023-03-22

## 2023-04-10 ENCOUNTER — NURSE ONLY (OUTPATIENT)
Dept: ENDOCRINOLOGY CLINIC | Facility: CLINIC | Age: 63
End: 2023-04-10

## 2023-04-10 DIAGNOSIS — M81.0 AGE-RELATED OSTEOPOROSIS WITHOUT CURRENT PATHOLOGICAL FRACTURE: Primary | ICD-10-CM

## 2023-04-10 NOTE — PROGRESS NOTES
Patient presented into clinic for Evenity. 210mg administered into bilateral upper arms. No side effects or complications. Patient is aware to schedule in 1 month,  to assist     Patient left in stable condition.

## 2023-04-18 ENCOUNTER — HOSPITAL ENCOUNTER (OUTPATIENT)
Dept: GASTROENTEROLOGY | Age: 63
Discharge: HOME | End: 2023-04-18
Attending: INTERNAL MEDICINE

## 2023-04-18 ENCOUNTER — ANESTHESIA EVENT (OUTPATIENT)
Dept: GASTROENTEROLOGY | Age: 63
End: 2023-04-18

## 2023-04-18 ENCOUNTER — ANESTHESIA (OUTPATIENT)
Dept: GASTROENTEROLOGY | Age: 63
End: 2023-04-18

## 2023-04-18 VITALS
TEMPERATURE: 98.1 F | SYSTOLIC BLOOD PRESSURE: 137 MMHG | OXYGEN SATURATION: 100 % | BODY MASS INDEX: 20.3 KG/M2 | HEART RATE: 81 BPM | DIASTOLIC BLOOD PRESSURE: 88 MMHG | HEIGHT: 66 IN | RESPIRATION RATE: 16 BRPM | WEIGHT: 126.32 LBS

## 2023-04-18 DIAGNOSIS — K20.90 ESOPHAGITIS: ICD-10-CM

## 2023-04-18 DIAGNOSIS — Z86.19 PERSONAL HISTORY OF OTHER INFECTIOUS AND PARASITIC DISEASE: ICD-10-CM

## 2023-04-18 DIAGNOSIS — R10.13 EPIGASTRIC PAIN: ICD-10-CM

## 2023-04-18 DIAGNOSIS — K29.70 GASTRITIS WITHOUT BLEEDING, UNSPECIFIED CHRONICITY, UNSPECIFIED GASTRITIS TYPE: ICD-10-CM

## 2023-04-18 DIAGNOSIS — R14.0 ABDOMINAL DISTENTION: ICD-10-CM

## 2023-04-18 DIAGNOSIS — Z80.0 FAMILY HISTORY OF MALIGNANT NEOPLASM OF DIGESTIVE ORGAN: ICD-10-CM

## 2023-04-18 DIAGNOSIS — K21.9 GASTROESOPHAGEAL REFLUX DISEASE: ICD-10-CM

## 2023-04-18 PROCEDURE — 10002801 HB RX 250 W/O HCPCS: Performed by: SPECIALIST

## 2023-04-18 PROCEDURE — 10002807 HB RX 258: Performed by: SPECIALIST

## 2023-04-18 PROCEDURE — 13000024 HB GI COMPLEX CASE S/U + 1ST 15 MIN

## 2023-04-18 PROCEDURE — 13000008 HB ANESTHESIA MAC OUTSIDE OR

## 2023-04-18 PROCEDURE — 88305 TISSUE EXAM BY PATHOLOGIST: CPT | Performed by: INTERNAL MEDICINE

## 2023-04-18 PROCEDURE — 10004451 HB PACU RECOVERY 1ST 30 MINUTES

## 2023-04-18 PROCEDURE — 10002800 HB RX 250 W HCPCS: Performed by: SPECIALIST

## 2023-04-18 PROCEDURE — 13000001 HB PHASE II RECOVERY EA 30 MINUTES

## 2023-04-18 PROCEDURE — 10002807 HB RX 258: Performed by: INTERNAL MEDICINE

## 2023-04-18 RX ORDER — SODIUM CHLORIDE 9 MG/ML
INJECTION, SOLUTION INTRAVENOUS CONTINUOUS
Status: DISCONTINUED | OUTPATIENT
Start: 2023-04-18 | End: 2023-04-20 | Stop reason: HOSPADM

## 2023-04-18 RX ORDER — PROPOFOL 10 MG/ML
INJECTION, EMULSION INTRAVENOUS PRN
Status: DISCONTINUED | OUTPATIENT
Start: 2023-04-18 | End: 2023-04-18

## 2023-04-18 RX ORDER — ACETAMINOPHEN 325 MG/1
650 TABLET ORAL EVERY 4 HOURS PRN
COMMUNITY

## 2023-04-18 RX ORDER — LIDOCAINE HYDROCHLORIDE 20 MG/ML
INJECTION, SOLUTION INFILTRATION; PERINEURAL PRN
Status: DISCONTINUED | OUTPATIENT
Start: 2023-04-18 | End: 2023-04-18

## 2023-04-18 RX ORDER — GLYCOPYRROLATE 0.2 MG/ML
INJECTION, SOLUTION INTRAMUSCULAR; INTRAVENOUS PRN
Status: DISCONTINUED | OUTPATIENT
Start: 2023-04-18 | End: 2023-04-18

## 2023-04-18 RX ORDER — KETAMINE HYDROCHLORIDE 50 MG/ML
INJECTION, SOLUTION, CONCENTRATE INTRAMUSCULAR; INTRAVENOUS PRN
Status: DISCONTINUED | OUTPATIENT
Start: 2023-04-18 | End: 2023-04-18

## 2023-04-18 RX ORDER — SODIUM CHLORIDE 9 MG/ML
INJECTION, SOLUTION INTRAVENOUS CONTINUOUS PRN
Status: DISCONTINUED | OUTPATIENT
Start: 2023-04-18 | End: 2023-04-18

## 2023-04-18 RX ORDER — MIDAZOLAM HYDROCHLORIDE 1 MG/ML
INJECTION, SOLUTION INTRAMUSCULAR; INTRAVENOUS PRN
Status: DISCONTINUED | OUTPATIENT
Start: 2023-04-18 | End: 2023-04-18

## 2023-04-18 RX ADMIN — PROPOFOL 50 MG: 10 INJECTION, EMULSION INTRAVENOUS at 12:57

## 2023-04-18 RX ADMIN — SODIUM CHLORIDE: 9 INJECTION, SOLUTION INTRAVENOUS at 12:45

## 2023-04-18 RX ADMIN — LIDOCAINE HYDROCHLORIDE 5 ML: 20 INJECTION, SOLUTION INFILTRATION; PERINEURAL at 12:57

## 2023-04-18 RX ADMIN — PROPOFOL 75 MCG/KG/MIN: 10 INJECTION, EMULSION INTRAVENOUS at 12:59

## 2023-04-18 RX ADMIN — GLYCOPYRROLATE 0.2 MG: 0.2 INJECTION, SOLUTION INTRAMUSCULAR; INTRAVENOUS at 12:54

## 2023-04-18 RX ADMIN — FENTANYL CITRATE 50 MCG: 50 INJECTION, SOLUTION INTRAMUSCULAR; INTRAVENOUS at 12:55

## 2023-04-18 RX ADMIN — MIDAZOLAM HYDROCHLORIDE 2 MG: 1 INJECTION, SOLUTION INTRAMUSCULAR; INTRAVENOUS at 12:54

## 2023-04-18 RX ADMIN — SODIUM CHLORIDE: 9 INJECTION, SOLUTION INTRAVENOUS at 12:15

## 2023-04-18 RX ADMIN — KETAMINE HYDROCHLORIDE 15 MG: 50 INJECTION INTRAMUSCULAR; INTRAVENOUS at 12:57

## 2023-04-18 ASSESSMENT — PAIN SCALES - GENERAL
PAINLEVEL_OUTOF10: 0

## 2023-04-18 ASSESSMENT — COGNITIVE AND FUNCTIONAL STATUS - GENERAL
ARE YOU DEAF OR DO YOU HAVE SERIOUS DIFFICULTY  HEARING: NO
ARE YOU BLIND OR DO YOU HAVE SERIOUS DIFFICULTY SEEING, EVEN WHEN WEARING GLASSES: NO

## 2023-04-18 ASSESSMENT — ACTIVITIES OF DAILY LIVING (ADL)
HISTORY OF FALLING IN THE LAST YEAR (PRIOR TO ADMISSION): NO
NEEDS_ASSIST: NO
ADL_BEFORE_ADMISSION: INDEPENDENT
CHRONIC_PAIN_PRESENT: YES, CHRONIC
SENSORY_SUPPORT_DEVICES: EYEGLASSES
ADL_SHORT_OF_BREATH: NO
ADL_SCORE: 12
RECENT_DECLINE_ADL: NO
DESCRIBE HOW PAIN IMPACTS YOUR LIFE: MOBILITY;PHYSICAL ACTIVITY

## 2023-04-18 ASSESSMENT — PAIN SCALES - WONG BAKER: WONGBAKER_NUMERICALRESPONSE: 0

## 2023-04-19 LAB
ASR DISCLAIMER: NORMAL
CASE RPRT: NORMAL
CLINICAL INFO: NORMAL
PATH REPORT.FINAL DX SPEC: NORMAL
PATH REPORT.GROSS SPEC: NORMAL

## 2023-04-21 ENCOUNTER — MED REC SCAN ONLY (OUTPATIENT)
Dept: INTERNAL MEDICINE CLINIC | Facility: CLINIC | Age: 63
End: 2023-04-21

## 2023-05-08 ENCOUNTER — MED REC SCAN ONLY (OUTPATIENT)
Dept: INTERNAL MEDICINE CLINIC | Facility: CLINIC | Age: 63
End: 2023-05-08

## 2023-05-11 ENCOUNTER — NURSE ONLY (OUTPATIENT)
Dept: ENDOCRINOLOGY CLINIC | Facility: CLINIC | Age: 63
End: 2023-05-11

## 2023-05-11 DIAGNOSIS — M81.0 AGE-RELATED OSTEOPOROSIS WITHOUT CURRENT PATHOLOGICAL FRACTURE: Primary | ICD-10-CM

## 2023-05-11 PROCEDURE — 96372 THER/PROPH/DIAG INJ SC/IM: CPT | Performed by: INTERNAL MEDICINE

## 2023-05-11 NOTE — PROGRESS NOTES
Patient presents for Evenity Injection. Patient tolerated well on L and R upper arm. Patient understands to return in 1 month for next injection.

## 2023-05-19 ENCOUNTER — PATIENT MESSAGE (OUTPATIENT)
Dept: INTERNAL MEDICINE CLINIC | Facility: CLINIC | Age: 63
End: 2023-05-19

## 2023-05-19 NOTE — TELEPHONE ENCOUNTER
From: Garth Callahan  To: Haider Madrigal MD  Sent: 5/19/2023 8:30 AM CDT  Subject: Toe Pain     Hello, so I'm having pain and tingling in my second and third toe, right foot for awhile now.  Want to have it looked at, but not sure if I should go to a foot doc, or Dr Domenic Julio first?Thank you, Álvaro [facility] [phone]

## 2023-06-06 ENCOUNTER — HOSPITAL ENCOUNTER (OUTPATIENT)
Dept: MRI IMAGING | Facility: HOSPITAL | Age: 63
Discharge: HOME | End: 2023-06-06
Attending: PHYSICAL MEDICINE & REHABILITATION
Payer: MEDICAID

## 2023-06-06 DIAGNOSIS — M54.16 RIGHT LUMBAR RADICULOPATHY: ICD-10-CM

## 2023-06-06 PROCEDURE — 72148 MRI LUMBAR SPINE W/O DYE: CPT | Performed by: PHYSICAL MEDICINE & REHABILITATION

## 2023-06-10 ENCOUNTER — OFFICE VISIT (OUTPATIENT)
Dept: INTERNAL MEDICINE CLINIC | Facility: CLINIC | Age: 63
End: 2023-06-10

## 2023-06-10 VITALS
SYSTOLIC BLOOD PRESSURE: 105 MMHG | WEIGHT: 126.38 LBS | HEIGHT: 66 IN | BODY MASS INDEX: 20.31 KG/M2 | DIASTOLIC BLOOD PRESSURE: 68 MMHG | HEART RATE: 71 BPM | OXYGEN SATURATION: 97 %

## 2023-06-10 DIAGNOSIS — R20.2 NUMBNESS AND TINGLING: Primary | ICD-10-CM

## 2023-06-10 DIAGNOSIS — Z00.00 PHYSICAL EXAM, ANNUAL: ICD-10-CM

## 2023-06-10 DIAGNOSIS — R20.0 NUMBNESS AND TINGLING: Primary | ICD-10-CM

## 2023-06-10 PROCEDURE — 3074F SYST BP LT 130 MM HG: CPT | Performed by: INTERNAL MEDICINE

## 2023-06-10 PROCEDURE — 99213 OFFICE O/P EST LOW 20 MIN: CPT | Performed by: INTERNAL MEDICINE

## 2023-06-10 PROCEDURE — 3078F DIAST BP <80 MM HG: CPT | Performed by: INTERNAL MEDICINE

## 2023-06-10 PROCEDURE — 3008F BODY MASS INDEX DOCD: CPT | Performed by: INTERNAL MEDICINE

## 2023-06-12 ENCOUNTER — TELEPHONE (OUTPATIENT)
Dept: ENDOCRINOLOGY CLINIC | Facility: CLINIC | Age: 63
End: 2023-06-12

## 2023-06-12 ENCOUNTER — NURSE ONLY (OUTPATIENT)
Dept: ENDOCRINOLOGY CLINIC | Facility: CLINIC | Age: 63
End: 2023-06-12

## 2023-06-12 DIAGNOSIS — M81.0 AGE-RELATED OSTEOPOROSIS WITHOUT CURRENT PATHOLOGICAL FRACTURE: Primary | ICD-10-CM

## 2023-06-12 NOTE — TELEPHONE ENCOUNTER
Dr. Dimitrios Jarrell,     Please advise    Patient aware of 5/11 TE regarding changing 7/14 appointment to August. Patient requesting to keep July appointment and if can get labs drawn around 7/10.  (last labs were on 1/30)    Staff: ok to MyChart message recommendation

## 2023-06-12 NOTE — PROGRESS NOTES
Patient came in for Evenity. Administered Evenity 210mg into bilateral arms     No side effects or complications present.  Communicated 5/11/23 TE, separate TE sent to Dr. Jessica Leblanc    Patient left in stable condition

## 2023-06-20 ENCOUNTER — TELEMEDICINE (OUTPATIENT)
Dept: PHYSICAL MEDICINE AND REHAB | Facility: CLINIC | Age: 63
End: 2023-06-20
Payer: MEDICAID

## 2023-06-20 ENCOUNTER — TELEPHONE (OUTPATIENT)
Dept: PHYSICAL MEDICINE AND REHAB | Facility: CLINIC | Age: 63
End: 2023-06-20

## 2023-06-20 ENCOUNTER — HOSPITAL ENCOUNTER (OUTPATIENT)
Dept: MAMMOGRAPHY | Facility: HOSPITAL | Age: 63
Discharge: HOME | End: 2023-06-20
Attending: INTERNAL MEDICINE
Payer: MEDICAID

## 2023-06-20 DIAGNOSIS — M54.16 RIGHT LUMBAR RADICULOPATHY: Primary | ICD-10-CM

## 2023-06-20 DIAGNOSIS — Z12.31 ENCOUNTER FOR SCREENING MAMMOGRAM FOR MALIGNANT NEOPLASM OF BREAST: ICD-10-CM

## 2023-06-20 DIAGNOSIS — M81.0 OSTEOPOROSIS, UNSPECIFIED OSTEOPOROSIS TYPE, UNSPECIFIED PATHOLOGICAL FRACTURE PRESENCE: ICD-10-CM

## 2023-06-20 DIAGNOSIS — M47.816 FACET ARTHROPATHY, LUMBAR: ICD-10-CM

## 2023-06-20 PROCEDURE — 99214 OFFICE O/P EST MOD 30 MIN: CPT | Performed by: PHYSICAL MEDICINE & REHABILITATION

## 2023-06-20 PROCEDURE — 77067 SCR MAMMO BI INCL CAD: CPT | Performed by: INTERNAL MEDICINE

## 2023-06-20 PROCEDURE — 77063 BREAST TOMOSYNTHESIS BI: CPT | Performed by: INTERNAL MEDICINE

## 2023-06-20 NOTE — TELEPHONE ENCOUNTER
Initiated authorization for L5-S1 Interlaminar Epidural Steroid Injection under fluoroscopy guidance CPT 67728 dx:M54.16 to be done at [facility] with [employer]  Status: Approved w/ authorization #[id number] valid 6/20/23-8/19/23

## 2023-06-21 NOTE — TELEPHONE ENCOUNTER
Patient has been scheduled for LUMBAR INTERLAMINAR EPIDURAL INJECTION L5-S1 on 8/11/23 at the [facility] [address] with Dr. Kylie Chen.   -Anesthesia type: Local.  -Patient informed to fast 12 hours prior to procedure with IVCS/MAC.   -Scheduling [address] covid testing required for all procedures whether patient is vaccinated or not. -Patient was advised that if he/she does receive the covid vaccine it needs to be at least 2 weeks before or after the injection. -Medications and allergies reviewed. -Patient reminded to hold NSAIDs (Ibuprofen, ASA 81, Aleve, Naproxen, Mobic, Diclofenac, Etodolac, Celebrex etc.) for 3 days prior to Lumbar MBB/Facet if BMI is greater than 35. For Cervical injections only hold multivitamins, Vitamin E, Fish Oil, Phentermine/Lomaira for 7 days prior to injection and NSAIDS.  mg to be held for 7 days prior to injections.  -If patient is receiving MAC/IVCS Phentermine Benjamen Herd) will need to be held for 7 days prior to injection.  -If on blood thinner clearance has been received to hold this medication by provider.   -Patient informed he/she will need a  to and from procedure. Yue Andre is located in the [facility] 16[address] 1st floor,  may park in the yellow/purple parking lot. Patient verbalized understanding and agrees with plan.  -----> Scheduled in Epic: Yes  -----> Scheduled in [employer]: No- surgical case sent. Patient denied scheduling a follow up appt at this time.

## 2023-06-30 ENCOUNTER — PATIENT MESSAGE (OUTPATIENT)
Dept: RHEUMATOLOGY | Facility: CLINIC | Age: 63
End: 2023-06-30

## 2023-06-30 NOTE — TELEPHONE ENCOUNTER
From: Rocky Joy  To: Concepcion Paul MD  Sent: 6/30/2023 10:39 AM CDT  Subject: Handicap Plaque     Hi Dr Sanjana Reno. Can you please extend the Handicap plaque for me, I'm getting back injections in August, hopefully it will help for the pain so I can possibly walk better with less pain! Thank you!  Shemar Silva

## 2023-07-10 ENCOUNTER — LAB ENCOUNTER (OUTPATIENT)
Dept: LAB | Facility: HOSPITAL | Age: 63
End: 2023-07-10
Attending: INTERNAL MEDICINE
Payer: MEDICAID

## 2023-07-10 DIAGNOSIS — Z00.00 ROUTINE GENERAL MEDICAL EXAMINATION AT A HEALTH CARE FACILITY: ICD-10-CM

## 2023-07-10 DIAGNOSIS — R20.2 PARESTHESIA: ICD-10-CM

## 2023-07-10 DIAGNOSIS — R20.0 TACTILE ANESTHESIA: Primary | ICD-10-CM

## 2023-07-10 LAB
ANION GAP SERPL CALC-SCNC: 7 MMOL/L (ref 0–18)
BUN BLD-MCNC: 12 MG/DL (ref 7–18)
BUN/CREAT SERPL: 17.4 (ref 10–20)
CALCIUM BLD-MCNC: 9.4 MG/DL (ref 8.5–10.1)
CHLORIDE SERPL-SCNC: 107 MMOL/L (ref 98–112)
CHOLEST SERPL-MCNC: 255 MG/DL (ref ?–200)
CO2 SERPL-SCNC: 27 MMOL/L (ref 21–32)
CREAT BLD-MCNC: 0.69 MG/DL
DEPRECATED RDW RBC AUTO: 42.8 FL (ref 35.1–46.3)
ERYTHROCYTE [DISTWIDTH] IN BLOOD BY AUTOMATED COUNT: 12.2 % (ref 11–15)
FASTING PATIENT LIPID ANSWER: YES
FASTING STATUS PATIENT QL REPORTED: YES
FOLATE SERPL-MCNC: 14.5 NG/ML (ref 8.7–?)
GFR SERPLBLD BASED ON 1.73 SQ M-ARVRAT: 97 ML/MIN/1.73M2 (ref 60–?)
GLUCOSE BLD-MCNC: 90 MG/DL (ref 70–99)
HCT VFR BLD AUTO: 41 %
HDLC SERPL-MCNC: 95 MG/DL (ref 40–59)
HGB BLD-MCNC: 13.3 G/DL
LDLC SERPL CALC-MCNC: 132 MG/DL (ref ?–100)
MCH RBC QN AUTO: 31.1 PG (ref 26–34)
MCHC RBC AUTO-ENTMCNC: 32.4 G/DL (ref 31–37)
MCV RBC AUTO: 96 FL
NONHDLC SERPL-MCNC: 160 MG/DL (ref ?–130)
OSMOLALITY SERPL CALC.SUM OF ELEC: 291 MOSM/KG (ref 275–295)
PLATELET # BLD AUTO: 408 10(3)UL (ref 150–450)
POTASSIUM SERPL-SCNC: 4 MMOL/L (ref 3.5–5.1)
PTH-INTACT SERPL-MCNC: 56.8 PG/ML (ref 18.5–88)
RBC # BLD AUTO: 4.27 X10(6)UL
SODIUM SERPL-SCNC: 141 MMOL/L (ref 136–145)
TRIGL SERPL-MCNC: 163 MG/DL (ref 30–149)
VIT B12 SERPL-MCNC: 794 PG/ML (ref 193–986)
VIT D+METAB SERPL-MCNC: 26 NG/ML (ref 30–100)
VLDLC SERPL CALC-MCNC: 30 MG/DL (ref 0–30)
WBC # BLD AUTO: 4.9 X10(3) UL (ref 4–11)

## 2023-07-10 PROCEDURE — 36415 COLL VENOUS BLD VENIPUNCTURE: CPT | Performed by: INTERNAL MEDICINE

## 2023-07-10 PROCEDURE — 82607 VITAMIN B-12: CPT | Performed by: INTERNAL MEDICINE

## 2023-07-10 PROCEDURE — 83970 ASSAY OF PARATHORMONE: CPT | Performed by: INTERNAL MEDICINE

## 2023-07-10 PROCEDURE — 85027 COMPLETE CBC AUTOMATED: CPT | Performed by: INTERNAL MEDICINE

## 2023-07-10 PROCEDURE — 80061 LIPID PANEL: CPT | Performed by: INTERNAL MEDICINE

## 2023-07-10 PROCEDURE — 80048 BASIC METABOLIC PNL TOTAL CA: CPT | Performed by: INTERNAL MEDICINE

## 2023-07-10 PROCEDURE — 84080 ASSAY ALKALINE PHOSPHATASES: CPT | Performed by: INTERNAL MEDICINE

## 2023-07-10 PROCEDURE — 82306 VITAMIN D 25 HYDROXY: CPT | Performed by: INTERNAL MEDICINE

## 2023-07-10 PROCEDURE — 84425 ASSAY OF VITAMIN B-1: CPT

## 2023-07-10 PROCEDURE — 82746 ASSAY OF FOLIC ACID SERUM: CPT | Performed by: INTERNAL MEDICINE

## 2023-07-12 LAB — VITAMIN B1 WHOLE BLD: 98.2 NMOL/L

## 2023-07-12 NOTE — TELEPHONE ENCOUNTER
Form placed on provider's desk.   Future Appointments   Date Time Provider Janice America   7/14/2023  8:30 AM Ramonita Harp MD [facility]   8/7/2023  8:40 AM Sydney Varner MD [address] [place] [address] OF THE [facility]   8/11/2023 12:30 PM Nancy Padilla DO [facility] OF [facility]   10/14/2023  8:20 AM Nicky Skiff, MD [facility]

## 2023-07-14 ENCOUNTER — TELEPHONE (OUTPATIENT)
Dept: ENDOCRINOLOGY CLINIC | Facility: CLINIC | Age: 63
End: 2023-07-14

## 2023-07-14 ENCOUNTER — OFFICE VISIT (OUTPATIENT)
Dept: ENDOCRINOLOGY CLINIC | Facility: CLINIC | Age: 63
End: 2023-07-14

## 2023-07-14 VITALS
BODY MASS INDEX: 20.41 KG/M2 | HEART RATE: 66 BPM | DIASTOLIC BLOOD PRESSURE: 85 MMHG | WEIGHT: 127 LBS | HEIGHT: 66 IN | SYSTOLIC BLOOD PRESSURE: 126 MMHG

## 2023-07-14 DIAGNOSIS — M81.0 AGE-RELATED OSTEOPOROSIS WITHOUT CURRENT PATHOLOGICAL FRACTURE: Primary | ICD-10-CM

## 2023-07-14 DIAGNOSIS — E55.9 VITAMIN D DEFICIENCY: ICD-10-CM

## 2023-07-14 LAB — ALKALINE PHOSPHATASE BONE SPECIFIC: 16.5 UG/L

## 2023-07-14 PROCEDURE — 99213 OFFICE O/P EST LOW 20 MIN: CPT | Performed by: INTERNAL MEDICINE

## 2023-07-14 PROCEDURE — 3008F BODY MASS INDEX DOCD: CPT | Performed by: INTERNAL MEDICINE

## 2023-07-14 PROCEDURE — 3079F DIAST BP 80-89 MM HG: CPT | Performed by: INTERNAL MEDICINE

## 2023-07-14 PROCEDURE — 96372 THER/PROPH/DIAG INJ SC/IM: CPT | Performed by: INTERNAL MEDICINE

## 2023-07-14 PROCEDURE — 3074F SYST BP LT 130 MM HG: CPT | Performed by: INTERNAL MEDICINE

## 2023-07-14 RX ORDER — ERGOCALCIFEROL 1.25 MG/1
50000 CAPSULE ORAL WEEKLY
Qty: 4 CAPSULE | Refills: 0 | Status: SHIPPED | OUTPATIENT
Start: 2023-07-14

## 2023-07-14 NOTE — TELEPHONE ENCOUNTER
Patient will be getting Evenity 12 th injection in mid August 2023  She will be needing prolia mid sep 2023  Please start PA for prolia accordingly

## 2023-08-07 ENCOUNTER — OFFICE VISIT (OUTPATIENT)
Dept: RHEUMATOLOGY | Facility: CLINIC | Age: 63
End: 2023-08-07

## 2023-08-07 VITALS
SYSTOLIC BLOOD PRESSURE: 117 MMHG | HEART RATE: 63 BPM | WEIGHT: 127.19 LBS | DIASTOLIC BLOOD PRESSURE: 81 MMHG | HEIGHT: 66 IN | BODY MASS INDEX: 20.44 KG/M2 | RESPIRATION RATE: 16 BRPM

## 2023-08-07 DIAGNOSIS — M81.0 AGE-RELATED OSTEOPOROSIS WITHOUT CURRENT PATHOLOGICAL FRACTURE: Primary | ICD-10-CM

## 2023-08-07 DIAGNOSIS — M54.50 CHRONIC BILATERAL LOW BACK PAIN WITHOUT SCIATICA: ICD-10-CM

## 2023-08-07 DIAGNOSIS — G89.29 CHRONIC BILATERAL LOW BACK PAIN WITHOUT SCIATICA: ICD-10-CM

## 2023-08-07 PROCEDURE — 3008F BODY MASS INDEX DOCD: CPT | Performed by: INTERNAL MEDICINE

## 2023-08-07 PROCEDURE — 3074F SYST BP LT 130 MM HG: CPT | Performed by: INTERNAL MEDICINE

## 2023-08-07 PROCEDURE — 99214 OFFICE O/P EST MOD 30 MIN: CPT | Performed by: INTERNAL MEDICINE

## 2023-08-07 PROCEDURE — 3079F DIAST BP 80-89 MM HG: CPT | Performed by: INTERNAL MEDICINE

## 2023-08-07 RX ORDER — DULOXETIN HYDROCHLORIDE 30 MG/1
30 CAPSULE, DELAYED RELEASE ORAL DAILY
Qty: 30 CAPSULE | Refills: 5 | Status: SHIPPED | OUTPATIENT
Start: 2023-08-07

## 2023-08-07 NOTE — PATIENT INSTRUCTIONS
1. Cont. Evenity shots from Endocrinology and start prolia  2. Follow up with Dr. Umesh Pelayo in 8/11/2023 -   3. iburpofen 800mg every 8 hours as needed   4. tyelnol as needed. 5. Start duloxetine 30mg a day - follow up with dr Graciela White about this for back pain - can try it after the epidurals to see if it helps  6.  Return as needed

## 2023-08-17 ENCOUNTER — TELEPHONE (OUTPATIENT)
Dept: ENDOCRINOLOGY CLINIC | Facility: CLINIC | Age: 63
End: 2023-08-17

## 2023-08-17 ENCOUNTER — NURSE ONLY (OUTPATIENT)
Dept: ENDOCRINOLOGY CLINIC | Facility: CLINIC | Age: 63
End: 2023-08-17

## 2023-08-17 DIAGNOSIS — M81.0 AGE-RELATED OSTEOPOROSIS WITHOUT CURRENT PATHOLOGICAL FRACTURE: Primary | ICD-10-CM

## 2023-08-17 PROCEDURE — 96372 THER/PROPH/DIAG INJ SC/IM: CPT | Performed by: INTERNAL MEDICINE

## 2023-08-17 NOTE — PROGRESS NOTES
Patient presents for 12th Evenity Injection , tolerated injection well on left and right upper arm. Patient understands to return in 1 month to begin Prolia injections - appointment booked.

## 2023-08-17 NOTE — TELEPHONE ENCOUNTER
Per TE 7/14/23, Prolia verification was started and received approval. Effective 08/15/2023 until 08/14/2024.

## 2023-08-17 NOTE — TELEPHONE ENCOUNTER
Dr. Laura Hernandez    Patient came in for last Evenity injection today (12th) per notes, she will transition to Prolia next month. Ok to begin verification? I did notify her to get Vitamin D labs done in October per your notes. Patient had inquired if osteoporosis blood work should be done again prior to appointment in January? Please advise.

## 2023-08-18 NOTE — TELEPHONE ENCOUNTER
Prolia one month after last evenity   Can come in for NV   Vit D as recommended      I will see her for second prolia  Can do labs prior to that apt   CMP  PTH  Vit D 25 OH   BS alk phos    Thanks

## 2023-09-19 ENCOUNTER — NURSE ONLY (OUTPATIENT)
Dept: ENDOCRINOLOGY CLINIC | Facility: CLINIC | Age: 63
End: 2023-09-19

## 2023-09-19 DIAGNOSIS — M81.0 AGE-RELATED OSTEOPOROSIS WITHOUT CURRENT PATHOLOGICAL FRACTURE: Primary | ICD-10-CM

## 2023-09-19 PROCEDURE — 96372 THER/PROPH/DIAG INJ SC/IM: CPT | Performed by: INTERNAL MEDICINE

## 2023-10-04 ENCOUNTER — PATIENT MESSAGE (OUTPATIENT)
Dept: INTERNAL MEDICINE CLINIC | Facility: CLINIC | Age: 63
End: 2023-10-04

## 2023-10-05 NOTE — TELEPHONE ENCOUNTER
From: Adam Mckinney  To: Venessa Schroeder  Sent: 10/4/2023 11:25 AM CDT  Subject: Dana Pearson, so I was summoned to [facility] but still having the fracture in my back, with pain, it's really not possible to sit there all day. Is there anyway you can excuse me with a letter? Please and thank you!  Ketan Albrecht

## 2023-10-14 ENCOUNTER — OFFICE VISIT (OUTPATIENT)
Dept: INTERNAL MEDICINE CLINIC | Facility: CLINIC | Age: 63
End: 2023-10-14
Payer: MEDICAID

## 2023-10-14 ENCOUNTER — LAB ENCOUNTER (OUTPATIENT)
Dept: LAB | Age: 63
End: 2023-10-14
Attending: INTERNAL MEDICINE
Payer: MEDICAID

## 2023-10-14 VITALS
WEIGHT: 125 LBS | DIASTOLIC BLOOD PRESSURE: 71 MMHG | SYSTOLIC BLOOD PRESSURE: 106 MMHG | RESPIRATION RATE: 18 BRPM | HEART RATE: 71 BPM | BODY MASS INDEX: 20.09 KG/M2 | HEIGHT: 66 IN

## 2023-10-14 DIAGNOSIS — Z00.00 PHYSICAL EXAM, ANNUAL: Primary | ICD-10-CM

## 2023-10-14 DIAGNOSIS — G62.9 NEUROPATHY: ICD-10-CM

## 2023-10-14 DIAGNOSIS — K13.0 CYST OF LIP: ICD-10-CM

## 2023-10-14 DIAGNOSIS — H61.21 RIGHT EAR IMPACTED CERUMEN: ICD-10-CM

## 2023-10-14 DIAGNOSIS — E53.8 B12 DEFICIENCY: ICD-10-CM

## 2023-10-14 LAB
ALBUMIN SERPL-MCNC: 4.1 G/DL (ref 3.4–5)
ALBUMIN/GLOB SERPL: 1 {RATIO} (ref 1–2)
ALP LIVER SERPL-CCNC: 85 U/L
ALT SERPL-CCNC: 17 U/L
ANION GAP SERPL CALC-SCNC: 8 MMOL/L (ref 0–18)
AST SERPL-CCNC: 19 U/L (ref 15–37)
BILIRUB SERPL-MCNC: 0.5 MG/DL (ref 0.1–2)
BUN BLD-MCNC: 11 MG/DL (ref 7–18)
BUN/CREAT SERPL: 14.5 (ref 10–20)
CALCIUM BLD-MCNC: 9.1 MG/DL (ref 8.5–10.1)
CHLORIDE SERPL-SCNC: 106 MMOL/L (ref 98–112)
CO2 SERPL-SCNC: 25 MMOL/L (ref 21–32)
CREAT BLD-MCNC: 0.76 MG/DL
EGFRCR SERPLBLD CKD-EPI 2021: 87 ML/MIN/1.73M2 (ref 60–?)
FASTING STATUS PATIENT QL REPORTED: NO
FOLATE SERPL-MCNC: 11.9 NG/ML (ref 8.7–?)
GLOBULIN PLAS-MCNC: 4.3 G/DL (ref 2.8–4.4)
GLUCOSE BLD-MCNC: 97 MG/DL (ref 70–99)
OSMOLALITY SERPL CALC.SUM OF ELEC: 287 MOSM/KG (ref 275–295)
POTASSIUM SERPL-SCNC: 4.3 MMOL/L (ref 3.5–5.1)
PROT SERPL-MCNC: 8.4 G/DL (ref 6.4–8.2)
SODIUM SERPL-SCNC: 139 MMOL/L (ref 136–145)
TSI SER-ACNC: 1.28 MIU/ML (ref 0.36–3.74)
VIT B12 SERPL-MCNC: 628 PG/ML (ref 193–986)

## 2023-10-14 PROCEDURE — 82607 VITAMIN B-12: CPT | Performed by: INTERNAL MEDICINE

## 2023-10-14 PROCEDURE — 84443 ASSAY THYROID STIM HORMONE: CPT | Performed by: INTERNAL MEDICINE

## 2023-10-14 PROCEDURE — 80053 COMPREHEN METABOLIC PANEL: CPT | Performed by: INTERNAL MEDICINE

## 2023-10-14 PROCEDURE — 36415 COLL VENOUS BLD VENIPUNCTURE: CPT | Performed by: INTERNAL MEDICINE

## 2023-10-14 PROCEDURE — 99396 PREV VISIT EST AGE 40-64: CPT | Performed by: INTERNAL MEDICINE

## 2023-10-14 PROCEDURE — 3078F DIAST BP <80 MM HG: CPT | Performed by: INTERNAL MEDICINE

## 2023-10-14 PROCEDURE — 83036 HEMOGLOBIN GLYCOSYLATED A1C: CPT | Performed by: INTERNAL MEDICINE

## 2023-10-14 PROCEDURE — 3074F SYST BP LT 130 MM HG: CPT | Performed by: INTERNAL MEDICINE

## 2023-10-14 PROCEDURE — 3008F BODY MASS INDEX DOCD: CPT | Performed by: INTERNAL MEDICINE

## 2023-10-14 PROCEDURE — 82746 ASSAY OF FOLIC ACID SERUM: CPT | Performed by: INTERNAL MEDICINE

## 2023-10-18 LAB
EST. AVERAGE GLUCOSE BLD GHB EST-MCNC: 108 MG/DL (ref 68–126)
HBA1C MFR BLD: 5.4 % (ref ?–5.7)

## 2023-11-21 ENCOUNTER — MED REC SCAN ONLY (OUTPATIENT)
Dept: INTERNAL MEDICINE CLINIC | Facility: CLINIC | Age: 63
End: 2023-11-21

## 2023-11-27 ENCOUNTER — PATIENT MESSAGE (OUTPATIENT)
Dept: ENDOCRINOLOGY CLINIC | Facility: CLINIC | Age: 63
End: 2023-11-27

## 2023-11-27 NOTE — TELEPHONE ENCOUNTER
From: Lucia Mata  To: Alma April  Sent: 11/27/2023 12:29 PM CST  Subject: Hakeem Thorpe Dr! I reapplied for disability hoping I can get approved this time. I desperately need the benefits and not being able to work, can use it! Last time I was sent to one of their doctors, who didn't talk to me and ask me any important medical questions for more than 5 minutes. I'm hoping you can please help me out if you are contacted by Disability.  Thank you, Chidi Cool

## 2024-02-14 ENCOUNTER — OFFICE VISIT (OUTPATIENT)
Dept: INTERNAL MEDICINE CLINIC | Facility: CLINIC | Age: 64
End: 2024-02-14
Payer: MEDICAID

## 2024-02-14 VITALS
WEIGHT: 129 LBS | DIASTOLIC BLOOD PRESSURE: 83 MMHG | OXYGEN SATURATION: 97 % | HEART RATE: 62 BPM | HEIGHT: 66 IN | BODY MASS INDEX: 20.73 KG/M2 | SYSTOLIC BLOOD PRESSURE: 122 MMHG

## 2024-02-14 DIAGNOSIS — M80.00XD AGE-RELATED OSTEOPOROSIS WITH CURRENT PATHOLOGICAL FRACTURE WITH ROUTINE HEALING: Primary | ICD-10-CM

## 2024-02-14 DIAGNOSIS — Z00.00 PHYSICAL EXAM, ANNUAL: ICD-10-CM

## 2024-02-14 DIAGNOSIS — Z12.31 SCREENING MAMMOGRAM, ENCOUNTER FOR: ICD-10-CM

## 2024-02-14 DIAGNOSIS — G89.29 CHRONIC MIDLINE LOW BACK PAIN, UNSPECIFIED WHETHER SCIATICA PRESENT: ICD-10-CM

## 2024-02-14 DIAGNOSIS — M54.50 CHRONIC MIDLINE LOW BACK PAIN, UNSPECIFIED WHETHER SCIATICA PRESENT: ICD-10-CM

## 2024-02-14 PROCEDURE — 99213 OFFICE O/P EST LOW 20 MIN: CPT | Performed by: INTERNAL MEDICINE

## 2024-02-14 NOTE — PROGRESS NOTES
Maya Jonas is a 63 year old female.  Chief Complaint   Patient presents with    Follow - Up     4 month follow-up       HPI:   Pt comes for f/u  C/c follow up  C/o overall doing well  Fingers still hurt and she doesn't have to see rheum , she has apt with endo coming up  Saw physiatry and was recommended injections to start with  Did not see ENT for the cyst in her lip and did not grow and sometimes forgets about it        HISTORY   still has back pain -increases with bad weather - started prolia   Saw dr thompson the podiatrist and rec   conservative treatment such as shoe wear modification, avoiding walking barefoot, vitamin b12 supplementation, however if that fails, may need surgical intervention to try and increase im angle to decrease pressure on the nerve. patient understands, along with possible exostosis removal of midfoot   Was rec [facility] and [facility]     Stopped meds for the pain bc it would hurt her stomach and will see gi dr raya in dec      HISTORY  numbness in the right toes , had bunion surg 8 yrs ago and also had hammer tie surg but the bone that was hurting is back-- started before stopping the lyrica      HISTORY  -GI-dr raya-trial of Prilosec 20 mg once a day and CT abdomen and pelvis but per insurance has to do the ultrasound for states she will get that done tomorrow  Back still hurts and she sees dr gagnon   Has shooting pain going down her right leg for a month  No falls trauma or injury  +lower back   Has not really tried anything except Tylenol which helps a little bit  Better ?  Worse -sitting up from lying down position        HISTORY  New pt -used to see dr heard who passed away   C/c establish care -reffered by PT Carmela   C/o annual physical   has fractured ribs from OP , going to physical therapy for back pain  Had been on fosamax 15 yrs ago now on romosozumab           PMH  Compression fracture in her spine -sees dr daren gagnon   OP- dr freire on once a mn inj -romosozumab/evenity(has been  on Fosamax and Reclast in the past)  gerd   Arthritis - sees dr GRAVES and dr gagnon   Lumbar stenosis on MRI 2023     Lives with mom and dad who she takes of -in their 90s   Retired , used to work in the [employer]        Current Outpatient Medications   Medication Sig Dispense Refill    ascorbic acid 250 MG Oral Tab Take 1 tablet (250 mg total) by mouth daily. Take 2 tablets daily      cyanocobalamine 1000 MCG Oral Tab Take 1 tablet (1,000 mcg total) by mouth daily.      Vitamin D3 50 MCG (2000 UT) Oral Cap Take 1 capsule (2,000 Units total) by mouth daily. 30 capsule 3    Acetaminophen (TYLENOL OR) 500 mg as needed.          Past Medical History:   Diagnosis Date    Arthritis     Fibromyalgia     Osteoarthritis     Osteopenia       Past Surgical History:   Procedure Laterality Date    Colonoscopy      Hysterectomy            Tonsillectomy        Social History:  Social History     Socioeconomic History    Marital status: Single   Tobacco Use    Smoking status: Never     Passive exposure: Never    Smokeless tobacco: Never   Vaping Use    Vaping Use: Never used   Substance and Sexual Activity    Alcohol use: No    Drug use: No   Other Topics Concern    Caffeine Concern Yes     Comment: coffee daily    Exercise No   Social History Narrative    The patient does not use an assistive device..      The patient does live in a home with stairs.        REVIEW OF SYSTEMS:   GENERAL HEALTH: No fevers, chills, sweats, fatigue  VISION: No recent vision problems, blurry vision or double vision  RESPIRATORY: denies shortness of breath, cough, wheezing  CARDIOVASCULAR: denies chest pain on exertion, palpitations, swelling in feet  GI: denies abdominal pain and denies heartburn, nausea or vomiting  MUS: + back pain,  + mult joint pain, no muscle pain  NEURO: denies headaches , anxiety, depression    EXAM:   /83   Pulse 62   Ht 5' 6\" (1.676 m)   Wt 129 lb (58.5 kg)   SpO2 97%   BMI 20.82 kg/m²   GENERAL: well  developed, well nourished,in no apparent distress  SKIN: no rashes,no suspicious lesions  HEENT: atraumatic, normocephalic  NECK: supple,no adenopathy  LUNGS: clear to auscultation, no wheeze  CARDIO: RRR without murmur  EXTREMITIES: no cyanosis, or edema    ASSESSMENT AND PLAN:   Diagnoses and all orders for this visit:    Age-related osteoporosis with current pathological fracture with routine healing  -     VITAMIN D, SCREEN [68210][Q]    Chronic midline low back pain, unspecified whether sciatica present  -     PHYSIATRY - INTERNAL    Screening mammogram, encounter for  -     [facility] KIRILL 2D+3D SCREENING BILAT (CPT=77067/23908); Future    Physical exam, annual  -     Comp Metabolic Panel (14)  -     Lipid Panel  -     Assay, Thyroid Stim Hormone  -     CBC, Platelet; No Differential    Labs ordered to be done before her next visit              Preventative medicine   cscope -dr augie raya- in [place] - had both egd and cscope 5 yrs -ie 2026 4/2023 egd  Mammogram -6/2023, ordered   Pap- hysterectomy   dexa 4/23/2022 -OP left hip mild , severe in lumbar spine   Labs 1/2023 and 7/2023 , aware to check if [facility] has to [facility]       The patient indicates understanding of these issues and agrees to the plan.  No follow-ups on file.

## 2024-02-15 ENCOUNTER — TELEPHONE (OUTPATIENT)
Dept: ENDOCRINOLOGY CLINIC | Facility: CLINIC | Age: 64
End: 2024-02-15

## 2024-02-15 DIAGNOSIS — E55.9 VITAMIN D DEFICIENCY: Primary | ICD-10-CM

## 2024-02-15 DIAGNOSIS — M81.0 AGE-RELATED OSTEOPOROSIS WITHOUT CURRENT PATHOLOGICAL FRACTURE: ICD-10-CM

## 2024-02-15 NOTE — TELEPHONE ENCOUNTER
----- Message from Saadia Morse RN sent at 9/19/2023  9:32 AM CDT -----  Regarding: Prolia  Patient due back for Prolia March 2024      Pt's last Prolia injection was on 9/17/2023 with RN. Pt will be coming in on 03/20/2024 for f/u and prolia injection with MD    Submitted Prolia IV via [employer] portal  Awaiting SOB, 3-5 business days       ^^^^^^^^^^^^^^^^^^^^^^^^^^^^^^^^^^^^^^^^^^^^^^^^^^^^^^^^^^^^^^^^^^^^^^^^^^^^^^^^^^^^^^^^^^^^^^^      Dr. Muller, do you want patient to have blood work done before this visit? Please advise, if so please enter orders and I will inform patient. Thanks

## 2024-03-18 ENCOUNTER — LAB ENCOUNTER (OUTPATIENT)
Dept: LAB | Facility: HOSPITAL | Age: 64
End: 2024-03-18
Attending: INTERNAL MEDICINE
Payer: MEDICAID

## 2024-03-18 ENCOUNTER — PATIENT MESSAGE (OUTPATIENT)
Dept: INTERNAL MEDICINE CLINIC | Facility: CLINIC | Age: 64
End: 2024-03-18

## 2024-03-18 DIAGNOSIS — M81.0 AGE-RELATED OSTEOPOROSIS WITHOUT CURRENT PATHOLOGICAL FRACTURE: Primary | ICD-10-CM

## 2024-03-18 DIAGNOSIS — E55.9 VITAMIN D DEFICIENCY: ICD-10-CM

## 2024-03-18 LAB
ALBUMIN SERPL-MCNC: 4.5 G/DL (ref 3.2–4.8)
ALBUMIN/GLOB SERPL: 1.6 {RATIO} (ref 1–2)
ALP LIVER SERPL-CCNC: 59 U/L
ALT SERPL-CCNC: 10 U/L
ANION GAP SERPL CALC-SCNC: 3 MMOL/L (ref 0–18)
AST SERPL-CCNC: 20 U/L (ref ?–34)
BILIRUB SERPL-MCNC: 0.7 MG/DL (ref 0.2–1.1)
BUN BLD-MCNC: 9 MG/DL (ref 9–23)
BUN/CREAT SERPL: 13.6 (ref 10–20)
CALCIUM BLD-MCNC: 9.2 MG/DL (ref 8.7–10.4)
CHLORIDE SERPL-SCNC: 110 MMOL/L (ref 98–112)
CO2 SERPL-SCNC: 26 MMOL/L (ref 21–32)
CREAT BLD-MCNC: 0.66 MG/DL
EGFRCR SERPLBLD CKD-EPI 2021: 98 ML/MIN/1.73M2 (ref 60–?)
FASTING STATUS PATIENT QL REPORTED: YES
GLOBULIN PLAS-MCNC: 2.9 G/DL (ref 2.8–4.4)
GLUCOSE BLD-MCNC: 85 MG/DL (ref 70–99)
OSMOLALITY SERPL CALC.SUM OF ELEC: 286 MOSM/KG (ref 275–295)
POTASSIUM SERPL-SCNC: 3.9 MMOL/L (ref 3.5–5.1)
PROT SERPL-MCNC: 7.4 G/DL (ref 5.7–8.2)
PTH-INTACT SERPL-MCNC: 97.7 PG/ML (ref 18.5–88)
SODIUM SERPL-SCNC: 139 MMOL/L (ref 136–145)
VIT D+METAB SERPL-MCNC: 29.7 NG/ML (ref 30–100)

## 2024-03-18 PROCEDURE — 36415 COLL VENOUS BLD VENIPUNCTURE: CPT | Performed by: INTERNAL MEDICINE

## 2024-03-18 PROCEDURE — 80053 COMPREHEN METABOLIC PANEL: CPT | Performed by: INTERNAL MEDICINE

## 2024-03-18 PROCEDURE — 84080 ASSAY ALKALINE PHOSPHATASES: CPT

## 2024-03-18 PROCEDURE — 82306 VITAMIN D 25 HYDROXY: CPT

## 2024-03-18 PROCEDURE — 83970 ASSAY OF PARATHORMONE: CPT | Performed by: INTERNAL MEDICINE

## 2024-03-18 NOTE — TELEPHONE ENCOUNTER
From: Maya Jonas  To: Bety Fernandez  Sent: 3/18/2024 3:37 PM CDT  Subject: UTI    Hello. I think I have a UTI, my urine has a really bad oder, I just had blood work today for Dr Muller, I have in appointment Wednesday 20th, if you want to check it out to see if any results could show if UTI? Or do I need to take a urine sample? Thank you, Maya Jonas.

## 2024-03-19 ENCOUNTER — NURSE TRIAGE (OUTPATIENT)
Dept: INTERNAL MEDICINE CLINIC | Facility: CLINIC | Age: 64
End: 2024-03-19

## 2024-03-19 ENCOUNTER — OFFICE VISIT (OUTPATIENT)
Dept: INTERNAL MEDICINE CLINIC | Facility: CLINIC | Age: 64
End: 2024-03-19
Payer: MEDICAID

## 2024-03-19 VITALS
WEIGHT: 132 LBS | HEART RATE: 69 BPM | BODY MASS INDEX: 21.21 KG/M2 | DIASTOLIC BLOOD PRESSURE: 80 MMHG | HEIGHT: 66 IN | SYSTOLIC BLOOD PRESSURE: 123 MMHG | RESPIRATION RATE: 16 BRPM

## 2024-03-19 DIAGNOSIS — R39.9 UTI SYMPTOMS: Primary | ICD-10-CM

## 2024-03-19 LAB
APPEARANCE: CLEAR
BILIRUBIN: NEGATIVE
GLUCOSE (URINE DIPSTICK): NEGATIVE MG/DL
KETONES (URINE DIPSTICK): NEGATIVE MG/DL
MULTISTIX LOT#: ABNORMAL NUMERIC
NITRITE, URINE: NEGATIVE
OCCULT BLOOD: NEGATIVE
PH, URINE: 7.5 (ref 4.5–8)
PROTEIN (URINE DIPSTICK): NEGATIVE MG/DL
SPECIFIC GRAVITY: 1.01 (ref 1–1.03)
URINE-COLOR: YELLOW
UROBILINOGEN,SEMI-QN: 0.2 MG/DL (ref 0–1.9)

## 2024-03-19 PROCEDURE — 81002 URINALYSIS NONAUTO W/O SCOPE: CPT | Performed by: NURSE PRACTITIONER

## 2024-03-19 PROCEDURE — 99213 OFFICE O/P EST LOW 20 MIN: CPT | Performed by: NURSE PRACTITIONER

## 2024-03-19 RX ORDER — CEPHALEXIN 500 MG/1
500 CAPSULE ORAL 2 TIMES DAILY
Qty: 14 CAPSULE | Refills: 0 | Status: SHIPPED | OUTPATIENT
Start: 2024-03-19 | End: 2024-03-26

## 2024-03-19 NOTE — TELEPHONE ENCOUNTER
Action Requested: Summary for Provider     []  Critical Lab, Recommendations Needed  [] Need Additional Advice  []   FYI    []   Need Orders  [] Need Medications Sent to Pharmacy  []  Other     SUMMARY: Per protocol disposition advised office visit     Future Appointments   Date Time Provider Department Center   3/19/2024  4:40 PM Francisca Vegas APRN [facility]   3/20/2024  9:00 AM Shanna Muller MD [facility]   6/24/2024 12:00 PM [facility] RM[facility]   10/28/2024  9:00 AM Bety Fernandez MD [facility]     Reason for call: Urinary Symptoms  Onset: 3 days     Per patient she noticed that she has strong odor and urinary frequency/urgency for 3 days. Denies burning with urination. Afebrile. Denies worsening back pain(has at baseline), flank or abdominal pain. Denies other symptoms marked no under protocol.       Per MD Lingo message sent:    Hello. I think I have a UTI, my urine has a really bad oder, I just had blood work today for Dr Muller, I have in appointment Wednesday 20th, if you want to check it out to see if any results could show if UTI? Or do I need to take a urine sample? Thank you, Maya Jonas.     Reason for Disposition   Urinating more frequently than usual (i.e., frequency)    Protocols used: Urinary Symptoms-A-OH

## 2024-03-19 NOTE — PROGRESS NOTES
Maya Jonas is a 63 year old female.  Chief Complaint   Patient presents with    Frequency     With burning     HPI:   She presents with dysuria and urinary frequency. Her symptoms started on Saturday. She has not tried any OTC medications. She denies any fevers, abdominal pain or back pain. She denies frequent UTI's.       Current Outpatient Medications   Medication Sig Dispense Refill    cephalexin 500 MG Oral Cap Take 1 capsule (500 mg total) by mouth 2 (two) times daily for 7 days. 14 capsule 0    ascorbic acid 250 MG Oral Tab Take 1 tablet (250 mg total) by mouth daily. Take 2 tablets daily      cyanocobalamine 1000 MCG Oral Tab Take 1 tablet (1,000 mcg total) by mouth daily.      Vitamin D3 50 MCG (2000 UT) Oral Cap Take 1 capsule (2,000 Units total) by mouth daily. 30 capsule 3    Acetaminophen (TYLENOL OR) 500 mg as needed.          Past Medical History:   Diagnosis Date    Arthritis     Fibromyalgia     Osteoarthritis     Osteopenia       Past Surgical History:   Procedure Laterality Date    COLONOSCOPY      HYSTERECTOMY            TONSILLECTOMY        Social History:  Social History     Socioeconomic History    Marital status: Single   Tobacco Use    Smoking status: Never     Passive exposure: Never    Smokeless tobacco: Never   Vaping Use    Vaping Use: Never used   Substance and Sexual Activity    Alcohol use: No    Drug use: No   Other Topics Concern    Caffeine Concern Yes     Comment: coffee daily    Exercise No   Social History Narrative    The patient does not use an assistive device..      The patient does live in a home with stairs.      Family History   Problem Relation Age of Onset    Heart Disorder Father     Cancer Father         prostate    Stroke Father     Other (Other) Mother         RA    Stroke Mother     Cancer Mother       No Known Allergies     REVIEW OF SYSTEMS:     Review of Systems   Constitutional:  Negative for fever.   HENT: Negative.     Respiratory:  Negative for cough,  shortness of breath and wheezing.    Cardiovascular:  Negative for chest pain.   Gastrointestinal: Negative.    Genitourinary:  Positive for dysuria and frequency. Negative for hematuria and urgency.   Musculoskeletal: Negative.    Skin: Negative.    Neurological: Negative.    Psychiatric/Behavioral: Negative.        Wt Readings from Last 5 Encounters:   03/19/24 132 lb (59.9 kg)   02/14/24 129 lb (58.5 kg)   10/14/23 125 lb (56.7 kg)   08/07/23 127 lb 3.2 oz (57.7 kg)   07/14/23 127 lb (57.6 kg)     Body mass index is 21.31 kg/m².      EXAM:   /80   Pulse 69   Resp 16   Ht 5' 6\" (1.676 m)   Wt 132 lb (59.9 kg)   BMI 21.31 kg/m²     Physical Exam  Vitals reviewed.   Constitutional:       Appearance: Normal appearance.   HENT:      Head: Normocephalic.   Eyes:      Extraocular Movements: Extraocular movements intact.      Pupils: Pupils are equal, round, and reactive to light.   Cardiovascular:      Rate and Rhythm: Normal rate and regular rhythm.      Pulses: Normal pulses.   Pulmonary:      Breath sounds: Normal breath sounds. No wheezing.   Abdominal:      General: Bowel sounds are normal.      Palpations: Abdomen is soft.   Musculoskeletal:         General: No swelling. Normal range of motion.      Cervical back: Normal range of motion and neck supple.   Skin:     General: Skin is warm and dry.   Neurological:      Mental Status: She is alert and oriented to person, place, and time.   Psychiatric:         Mood and Affect: Mood normal.         Behavior: Behavior normal.            ASSESSMENT AND PLAN:   1. UTI symptoms  - POC Urinalysis, Manual Dip without microscopy [[id number]]  - cephalexin 500 MG Oral Cap; Take 1 capsule (500 mg total) by mouth 2 (two) times daily for 7 days.  Dispense: 14 capsule; Refill: 0  - Urine Culture, Routine [E]      The patient indicates understanding of these issues and agrees to the plan.  Return for if symptoms do not resolve.

## 2024-03-20 ENCOUNTER — OFFICE VISIT (OUTPATIENT)
Dept: ENDOCRINOLOGY CLINIC | Facility: CLINIC | Age: 64
End: 2024-03-20
Payer: MEDICAID

## 2024-03-20 VITALS
WEIGHT: 129 LBS | BODY MASS INDEX: 20.73 KG/M2 | HEIGHT: 66 IN | DIASTOLIC BLOOD PRESSURE: 88 MMHG | SYSTOLIC BLOOD PRESSURE: 136 MMHG | HEART RATE: 69 BPM

## 2024-03-20 DIAGNOSIS — R79.89 HIGH SERUM PARATHYROID HORMONE (PTH): ICD-10-CM

## 2024-03-20 DIAGNOSIS — M81.0 AGE-RELATED OSTEOPOROSIS WITHOUT CURRENT PATHOLOGICAL FRACTURE: Primary | ICD-10-CM

## 2024-03-20 DIAGNOSIS — E55.9 VITAMIN D DEFICIENCY: ICD-10-CM

## 2024-03-20 LAB — ALKALINE PHOSPHATASE BONE SPECIFIC: 9.7 UG/L

## 2024-03-20 PROCEDURE — 96372 THER/PROPH/DIAG INJ SC/IM: CPT | Performed by: INTERNAL MEDICINE

## 2024-03-20 PROCEDURE — 99214 OFFICE O/P EST MOD 30 MIN: CPT | Performed by: INTERNAL MEDICINE

## 2024-03-20 NOTE — PROGRESS NOTES
FU VISIT:     CHIEF COMPLAINT:    Chief Complaint   Patient presents with    Osteoporosis     Pt is in to follow up, Prolia Injection. Pt reports no recent falls/fractures/dental work since last visit with MD.           HISTORY OF PRESENT ILLNESS:   Maya Jonas is a 63 year old female who presents Osteoporosis.      Diagnosed on routine DXA after hysterectomy about 20 years ago    She has been on fosamax and reclast in the past  Received Prolia, first injection received Aug 2019. Next dose 2020, Sep 2020, 2021. She did not get any further injections, can not recall why  Started evenity Aug 2022- Aug 2023, prolia in Sep 2023    Fractures:   2022 : rib fractures. She bent to  something and developed pain, x rays confirmed rib fractures    CT 2022:   Moderate T6 and mild-to-moderate T7 vertebral compression fracture.  Minimal T8 superior endplate compression fracture.      Also noted h/o patellar fracture on Epic      No fall, no fractures       Calcium and Vit d intake: vit D 2000 units daily  Steroid use,  PPI use, chronic pain medication use, Anti-epileptics use: denies  RA: no  Exercise: no  She is postmenopausal  Maternal history of osteoporosis Yes  History of fractures: denies  History of kidney stones No     Smoking No   Alcohol No  History of thyroid disease No   History of calcium problems: No   History of Malabsorption: No     No h/o radiation  No extensive dental work planned in the future  + acid reflux, on medication     PAST MEDICAL HISTORY:   Past Medical History:   Diagnosis Date    Arthritis     Fibromyalgia     Osteoarthritis     Osteopenia        PAST SURGICAL HISTORY:   Past Surgical History:   Procedure Laterality Date    COLONOSCOPY      HYSTERECTOMY            TONSILLECTOMY         CURRENT MEDICATIONS:    Current Outpatient Medications   Medication Sig Dispense Refill    cephalexin 500 MG Oral Cap Take 1 capsule (500 mg total) by mouth 2 (two) times daily for 7  days. 14 capsule 0    ascorbic acid 250 MG Oral Tab Take 1 tablet (250 mg total) by mouth daily. Take 2 tablets daily      cyanocobalamine 1000 MCG Oral Tab Take 1 tablet (1,000 mcg total) by mouth daily.      Vitamin D3 50 MCG (2000 UT) Oral Cap Take 1 capsule (2,000 Units total) by mouth daily. 30 capsule 3    Acetaminophen (TYLENOL OR) 500 mg as needed.           ALLERGIES:  No Known Allergies    SOCIAL HISTORY:    Social History     Socioeconomic History    Marital status: Single   Tobacco Use    Smoking status: Never     Passive exposure: Never    Smokeless tobacco: Never   Vaping Use    Vaping Use: Never used   Substance and Sexual Activity    Alcohol use: No    Drug use: No   Other Topics Concern    Caffeine Concern Yes     Comment: coffee daily    Exercise No       FAMILY HISTORY:   Family History   Problem Relation Age of Onset    Heart Disorder Father     Cancer Father         prostate    Stroke Father     Other (Other) Mother         RA    Stroke Mother     Cancer Mother        ASSESSMENTS:     REVIEW OF SYSTEMS:  Constitutional: Negative for: weight change, fever, fatigue, cold/heat intolerance  Eyes: Negative for:  Visual changes, proptosis, blurring  ENT: Negative for:  dysphagia, neck swelling, dysphonia  Respiratory: Negative for:  dyspnea, cough  Cardiovascular: Negative for:  chest pain, palpitations, orthopnea  GI: Negative for:  abdominal pain, nausea, vomiting, diarrhea, constipation, bleeding  Neurology: Negative for: headache, numbness, weakness  Genito-Urinary: Negative for: dysuria, frequency  Psychiatric: Negative for:  depression, anxiety  Hematology/Lymphatics: Negative for: bruising, lower extremity edema  Endocrine: Negative for: polyuria, polydypsia  Skin: Negative for: rash, blister, cellulitis      PHYSICAL EXAM:   Vitals:    03/20/24 0855   BP: 136/88   Pulse: 69   Weight: 129 lb (58.5 kg)   Height: 5' 6\" (1.676 m)     BMI: Body mass index is 20.82 kg/m².       General Appearance:   alert, well developed, in no acute distress  Head: Atraumatic  Eyes:  normal conjunctivae, sclera., normal sclera and normal pupils  Throat/Neck: normal sound to voice. Normal hearing, normal speech  Respiratory:  Speaking in full sentences, non-labored. no increased work of breathing, no audible wheezing    Neuro: motor grossly intact, moving all extremities without difficulty  Psychiatric:  oriented to time, self, and place  Extremities: no obvious extremity swelling, no lesions        DATA:     Pertinent data reviewed    DXA April 2022:   LEFT FEMORAL NECK   BMD: 0.547 gm/sq. cm. T SCORE: -2.7 Z SCORE: -1.3       LEFT TOTAL HIP   BMD: 0.589 gm/sq. cm. T SCORE: -2.9 Z SCORE: -1.8       PA LUMBAR SPINE (L1 - L4)   BMD: 0.521 gm/sq. cm. T SCORE: -4.8 Z SCORE: -3.2           ASSESSMENT AND PLAN:    Patient is a 63 year old female with:      Osteoporosis with h/o fragility fractures.   Discussed Osteoporosis is detail including pathogenesis, risk factors and treatment options.    Given that she ha trid anti resporptiv treatments in the past and also ha a h/o fragility fractures, hence she was started on evenity  Aug 1939-1455, prolia in Sep 2023    - Labs  Reviewed  - Prolia second injection of this cycle today  DXA will be in May 2024, order provided  - Vit ID is slightly low, will increase from 2000 --> 3000 units daily , repeat vit D in 3 months  - Discussed dietary intake of calcium  - Resistance exercises  - Fall precautions.     2. Vitamin D deficiency     - Vit ID is slightly low, will increase from 2000 --> 3000 units daily , repeat vit D in 3 months    3. High PTH  Noted to have one high normal PTH in the past, other PTH normal  I reviewed the following:  PTH-calcium axis  Noted that she is suspected of vitamin D level.  I PTH could be secondary to low vitamin D versus increased production from parathyroid glands  Will replace vitamin D gently as above and recheck labs in a few months.   She will hydrate well  increase serum calcium and increased risk of kidney stones.   I have reviewed the implications of elevated TSH which include worsening of bones       RTC in 6 months with me.     Shanna Muller MD        Patient verbalized a complete  understanding of all of the above and did not have any further questions.    Labs: Calcium, vitamin D, parathyroid hormone

## 2024-03-28 ENCOUNTER — TELEPHONE (OUTPATIENT)
Dept: ENDOCRINOLOGY CLINIC | Facility: CLINIC | Age: 64
End: 2024-03-28

## 2024-03-28 NOTE — TELEPHONE ENCOUNTER
Called and spoke to patient, answered her questions regarding her PTH results, from Dr. Muller's last office visit notes on 3/20/24.  Patient understanding to all information provided. Call complete.

## 2024-04-09 ENCOUNTER — HOSPITAL ENCOUNTER (OUTPATIENT)
Dept: BONE DENSITY | Facility: HOSPITAL | Age: 64
Discharge: HOME | End: 2024-04-09
Attending: INTERNAL MEDICINE
Payer: MEDICARE

## 2024-04-09 DIAGNOSIS — M81.0 AGE-RELATED OSTEOPOROSIS WITHOUT CURRENT PATHOLOGICAL FRACTURE: ICD-10-CM

## 2024-04-09 PROCEDURE — 77080 DXA BONE DENSITY AXIAL: CPT | Performed by: INTERNAL MEDICINE

## 2024-04-23 ENCOUNTER — NURSE TRIAGE (OUTPATIENT)
Dept: INTERNAL MEDICINE CLINIC | Facility: CLINIC | Age: 64
End: 2024-04-23

## 2024-04-23 NOTE — TELEPHONE ENCOUNTER
Action Requested: Summary for Provider     []  Critical Lab, Recommendations Needed  [x] Need Additional Advice  []   FYI    []   Need Orders  [] Need Medications Sent to Pharmacy  []  Other     SUMMARY: Per protocol, patient should be seen in the office within 2 weeks. No appointment available. Patient prefers to see PCP.    Reason for call: Pain  Onset:     Patient reports of shoulder pain - area under the right shoulder, and right side of the back - bone by the bra line. She has been having this pain for a while. Pain is intermittent but daily, lasts for a few seconds, achy, burning/stingy. Pain comes out of nowhere, regardless of activity. Denies injury.     Also complains of left thumb pain. States she has arthritic pain with other fingers, however, noticed that left thumb is starting to look deformed, also possibly swollen. She has been taking Tylenol with no relief.    Patient prefers to see PCP but also seeking recommendation if Dr. Bety Fernandez would rather have patient see rheumatologist or endocrinologist as well.     Reason for Disposition   Shoulder pain is a chronic symptom (recurrent or ongoing AND present > 4 weeks)    Protocols used: Shoulder Pain-A-[place]

## 2024-04-23 NOTE — TELEPHONE ENCOUNTER
Patient is requesting to be seen     Pain sensation in my L thumb, right shoulder, and bone in right side of back. Started on Saturday.     Refuses other providers in office only wants to see Dr Bety Fernandez

## 2024-04-25 ENCOUNTER — OFFICE VISIT (OUTPATIENT)
Dept: INTERNAL MEDICINE CLINIC | Facility: CLINIC | Age: 64
End: 2024-04-25
Payer: MEDICARE

## 2024-04-25 VITALS
HEART RATE: 59 BPM | HEIGHT: 66 IN | WEIGHT: 128 LBS | DIASTOLIC BLOOD PRESSURE: 72 MMHG | SYSTOLIC BLOOD PRESSURE: 120 MMHG | OXYGEN SATURATION: 95 % | BODY MASS INDEX: 20.57 KG/M2

## 2024-04-25 DIAGNOSIS — M79.645 PAIN OF LEFT THUMB: Primary | ICD-10-CM

## 2024-04-25 DIAGNOSIS — G89.29 CHRONIC RIGHT SHOULDER PAIN: ICD-10-CM

## 2024-04-25 DIAGNOSIS — M25.511 CHRONIC RIGHT SHOULDER PAIN: ICD-10-CM

## 2024-04-25 PROCEDURE — 99213 OFFICE O/P EST LOW 20 MIN: CPT

## 2024-04-25 NOTE — PROGRESS NOTES
Subjective:   Maya Jonas is a 64 year old female who presents for Shoulder Pain (Pain in right shoulder, has been getting injections for the pain but does not feel like it is changing anything) and Finger Pain (Has been having pain in left thumb)     C/c chronic right shoulder pain that comes and goes   Has known arthritis and osteoporosis   Comes on when driving or out of nowhere, is a sudden sharp severe pain  Has been on lots of different pain pills which made her sick to her stomach, was developing an ulcer, and had hallucinations   Now just taking tylenol     Has a compression fracture in thoracic vertebrae   Getting prolia shots - just started 2 months ago and had them two years before  Sees Dr. Dina Sutton who wants to do a cement injection in the spine     Also c/o left thumb pain, it is becoming more deformed and painful and has a tingling sensation   Feels better when she straightens it out   Drops things when she tries to hold them in the left hand     Saw rheumatologist who said there was nothing else they could do for her     History/Other:    Chief Complaint Reviewed and Verified  Nursing Notes Reviewed and   Verified  Tobacco Reviewed  Allergies Reviewed  Medications Reviewed    Problem List Reviewed  Medical History Reviewed  Surgical History   Reviewed  OB Status Reviewed  Family History Reviewed         Tobacco:  She has never smoked tobacco.    Current Outpatient Medications   Medication Sig Dispense Refill   • cyanocobalamine 1000 MCG Oral Tab Take 1 tablet (1,000 mcg total) by mouth daily.     • Vitamin D3 50 MCG (2000 UT) Oral Cap Take 1 capsule (2,000 Units total) by mouth daily. 30 capsule 3   • Acetaminophen (TYLENOL OR) 500 mg as needed.             Review of Systems:  Review of Systems   Constitutional: Negative.    Respiratory: Negative.     Cardiovascular: Negative.    Gastrointestinal: Negative.    Musculoskeletal:  Positive for arthralgias and back pain.   Skin: Negative.     Neurological: Negative.      Objective:   /72   Pulse 59   Ht 5' 6\" (1.676 m)   Wt 128 lb (58.1 kg)   SpO2 95%   BMI 20.66 kg/m²  Estimated body mass index is 20.66 kg/m² as calculated from the following:    Height as of this encounter: 5' 6\" (1.676 m).    Weight as of this encounter: 128 lb (58.1 kg).  Physical Exam  Vitals reviewed.   Constitutional:       General: She is not in acute distress.     Appearance: Normal appearance. She is well-developed.   Cardiovascular:      Rate and Rhythm: Normal rate and regular rhythm.      Heart sounds: Normal heart sounds.   Pulmonary:      Effort: Pulmonary effort is normal.      Breath sounds: Normal breath sounds.   Musculoskeletal:      Right shoulder: No swelling or deformity. Normal range of motion.      Left hand: Deformity (arthritis deformities of fingers particulary left thumb) present.   Skin:     General: Skin is warm and dry.   Neurological:      Mental Status: She is alert and oriented to person, place, and time.     Assessment & Plan:   1. Pain of left thumb (Primary)  -     ORTHOPEDIC - INTERNAL  Can try voltaren gel, referred to hand specialist  2. Chronic right shoulder pain  Try lidocaine patches or voltaren gel     BELGICA Palacios, 4/25/2024, 8:11 AM

## 2024-06-24 ENCOUNTER — HOSPITAL ENCOUNTER (OUTPATIENT)
Dept: MAMMOGRAPHY | Facility: HOSPITAL | Age: 64
Discharge: HOME | End: 2024-06-24
Attending: INTERNAL MEDICINE

## 2024-06-24 DIAGNOSIS — Z12.31 SCREENING MAMMOGRAM, ENCOUNTER FOR: ICD-10-CM

## 2024-06-24 PROCEDURE — 77063 BREAST TOMOSYNTHESIS BI: CPT | Performed by: INTERNAL MEDICINE

## 2024-06-24 PROCEDURE — 77067 SCR MAMMO BI INCL CAD: CPT | Performed by: INTERNAL MEDICINE

## 2024-08-14 ENCOUNTER — OFFICE VISIT (OUTPATIENT)
Dept: INTERNAL MEDICINE CLINIC | Facility: CLINIC | Age: 64
End: 2024-08-14
Payer: MEDICARE

## 2024-08-14 VITALS
WEIGHT: 126 LBS | OXYGEN SATURATION: 96 % | HEART RATE: 65 BPM | HEIGHT: 66 IN | DIASTOLIC BLOOD PRESSURE: 78 MMHG | BODY MASS INDEX: 20.25 KG/M2 | SYSTOLIC BLOOD PRESSURE: 118 MMHG

## 2024-08-14 DIAGNOSIS — R30.9 PAINFUL URINATION: Primary | ICD-10-CM

## 2024-08-14 LAB
APPEARANCE: CLEAR
BILIRUBIN: NEGATIVE
GLUCOSE (URINE DIPSTICK): NEGATIVE MG/DL
KETONES (URINE DIPSTICK): NEGATIVE MG/DL
MULTISTIX LOT#: ABNORMAL NUMERIC
NITRITE, URINE: NEGATIVE
OCCULT BLOOD: NEGATIVE
PH, URINE: 6 (ref 4.5–8)
SPECIFIC GRAVITY: 1.01 (ref 1–1.03)
URINE-COLOR: YELLOW
UROBILINOGEN,SEMI-QN: 0.2 MG/DL (ref 0–1.9)

## 2024-08-14 PROCEDURE — 81002 URINALYSIS NONAUTO W/O SCOPE: CPT

## 2024-08-14 PROCEDURE — 99213 OFFICE O/P EST LOW 20 MIN: CPT

## 2024-08-14 RX ORDER — NITROFURANTOIN 25; 75 MG/1; MG/1
100 CAPSULE ORAL 2 TIMES DAILY
Qty: 10 CAPSULE | Refills: 0 | Status: SHIPPED | OUTPATIENT
Start: 2024-08-14 | End: 2024-08-19

## 2024-08-14 NOTE — PROGRESS NOTES
Subjective:   Maya Jonas is a 64 year old female who presents for Painful Urination     Symptoms began overnight, woke up with the urge to void and lower abdominal pain. Has burning when she urinates. She also had a UTI in March, prior to this no UTI in 10-15 years  Drinks a lot of water but does hold her urine sometimes   No flank pain, fevers or chills, no nausea, no diarrhea or constipation    History/Other:    Chief Complaint Reviewed and Verified  No Further Nursing Notes to   Review  Tobacco Reviewed  Allergies Reviewed  Medications Reviewed    Problem List Reviewed  Medical History Reviewed  Surgical History   Reviewed  OB Status Reviewed  Family History Reviewed         Tobacco:  She has never smoked tobacco.    Current Outpatient Medications   Medication Sig Dispense Refill    nitrofurantoin monohydrate macro 100 MG Oral Cap Take 1 capsule (100 mg total) by mouth 2 (two) times daily for 5 days. 10 capsule 0    cyanocobalamine 1000 MCG Oral Tab Take 1 tablet (1,000 mcg total) by mouth daily.      Vitamin D3 50 MCG (2000 UT) Oral Cap Take 1 capsule (2,000 Units total) by mouth daily. 30 capsule 3    Acetaminophen (TYLENOL OR) 500 mg as needed.         Review of Systems:  Review of Systems  10 point review of systems otherwise negative with the exception of HPI and assessment and plan    Objective:   /78   Pulse 65   Ht 5' 6\" (1.676 m)   Wt 126 lb (57.2 kg)   SpO2 96%   BMI 20.34 kg/m²  Estimated body mass index is 20.34 kg/m² as calculated from the following:    Height as of this encounter: 5' 6\" (1.676 m).    Weight as of this encounter: 126 lb (57.2 kg).  Physical Exam  Vitals reviewed.   Constitutional:       General: She is not in acute distress.     Appearance: Normal appearance. She is well-developed.   Cardiovascular:      Rate and Rhythm: Normal rate and regular rhythm.      Heart sounds: Normal heart sounds.   Pulmonary:      Effort: Pulmonary effort is normal.      Breath  sounds: Normal breath sounds.   Abdominal:      General: Bowel sounds are normal.      Palpations: Abdomen is soft.      Tenderness: There is no right CVA tenderness or left CVA tenderness.   Skin:     General: Skin is warm and dry.   Neurological:      Mental Status: She is alert and oriented to person, place, and time.       Assessment & Plan:   1. Painful urination (Primary)  -     POC Urinalysis, Manual Dip without microscopy [[id number]]  -     Nitrofurantoin Monohyd Macro; Take 1 capsule (100 mg total) by mouth 2 (two) times daily for 5 days.  Dispense: 10 capsule; Refill: 0  -     Urine Culture, Routine    BELGICA Palacios, 8/14/2024, 10:37 AM

## 2024-08-26 ENCOUNTER — OFFICE VISIT (OUTPATIENT)
Dept: OTOLARYNGOLOGY | Facility: CLINIC | Age: 64
End: 2024-08-26
Payer: MEDICARE

## 2024-08-26 ENCOUNTER — TELEPHONE (OUTPATIENT)
Dept: OTOLARYNGOLOGY | Facility: CLINIC | Age: 64
End: 2024-08-26

## 2024-08-26 DIAGNOSIS — K13.0 LIP LESION: Primary | ICD-10-CM

## 2024-08-26 DIAGNOSIS — H61.23 CERUMEN DEBRIS ON TYMPANIC MEMBRANE OF BOTH EARS: ICD-10-CM

## 2024-08-26 PROCEDURE — 99203 OFFICE O/P NEW LOW 30 MIN: CPT | Performed by: SPECIALIST

## 2024-08-26 NOTE — TELEPHONE ENCOUNTER
Patient has been scheduled for procedure with Dr. Graham on 9/9/24 at [facility].

## 2024-08-27 NOTE — PROGRESS NOTES
Maya Jonas is a 64 year old female.   Chief Complaint   Patient presents with    Mouth/Lip Problem     Patient is here due to bump on the inside of lip x 8 months. Reports slight pain.      HPI:   Patient here to get her left lower lip lesion checked.  Also to get her ears checked.    Current Outpatient Medications   Medication Sig Dispense Refill    cyanocobalamine 1000 MCG Oral Tab Take 1 tablet (1,000 mcg total) by mouth daily.      Vitamin D3 50 MCG (2000 UT) Oral Cap Take 1 capsule (2,000 Units total) by mouth daily. 30 capsule 3    Acetaminophen (TYLENOL OR) 500 mg as needed.          Past Medical History:    Arthritis    Fibromyalgia    Osteoarthritis    Osteopenia      Social History:  Social History     Socioeconomic History    Marital status: Single   Tobacco Use    Smoking status: Never     Passive exposure: Never    Smokeless tobacco: Never   Vaping Use    Vaping status: Never Used   Substance and Sexual Activity    Alcohol use: No    Drug use: No   Other Topics Concern    Caffeine Concern Yes     Comment: coffee daily    Exercise No   Social History Narrative    The patient does not use an assistive device..      The patient does live in a home with stairs.     Social Determinants of Health      [facility]        REVIEW OF SYSTEMS:   GENERAL HEALTH: feels well otherwise  GENERAL : denies fever, chills, sweats, weight loss, weight gain  SKIN: denies any unusual skin lesions or rashes  RESPIRATORY: denies shortness of breath with exertion  NEURO: denies headaches    EXAM:   There were no vitals taken for this visit.  System Details   Skin Inspection - Normal.   Constitutional Overall appearance - Normal.   Head/Face Facial features - Normal. Eyebrows - Normal. Skull - Normal.   Eyes Conjunctiva - Right: Normal, Left: Normal. Pupil - Right: Normal, Left: Normal.    Ears Inspection - Right: Normal, Left: Normal.   Ears = bilateral cerumen occlussions.    Fully  cleaned under microscope using instrumentation and suctioning.    Normal tympanic membranes.     Nasal External nose - Normal.   Nasal septum - Normal.  Turbinates - Normal.   Oral/Oropharynx Lips -0.6 x 0.8 cm left lower lip lesion.  Looks benign.  Soft to palpation.  Tonsils - Normal, Tongue - Normal    Neck Exam Inspection - Normal. Palpation - Normal. Parotid gland - Normal. Thyroid gland - Normal.   Lymph Detail Submental. Submandibular. Anterior cervical. Posterior cervical. Supraclavicular all without enlargement   Psychiatric Orientation - Oriented to time, place, person & situation. Appropriate mood and affect.   Neurological Memory - Normal. Cranial nerves - Cranial nerves II through XII grossly intact.     ASSESSMENT AND PLAN:   1. Lip lesion  Will excise with a vertical incision and primary closure under local anesthesia.  - Surgical Case Request; Future    2.  Cerumen occlusions bilaterally  Fully cleaned.    The patient indicates understanding of these issues and agrees to the plan.      Sandy Graham MD  8/26/2024  9:04 PM

## 2024-08-27 NOTE — PATIENT INSTRUCTIONS
Your ears are fully cleaned of cerumen.  You had a 0.8 x 0.6 cm left lower lip lesion which looks benign.  This will be excised under local anesthesia.

## 2024-09-02 ENCOUNTER — PATIENT MESSAGE (OUTPATIENT)
Dept: OTOLARYNGOLOGY | Facility: CLINIC | Age: 64
End: 2024-09-02

## 2024-09-03 ENCOUNTER — TELEPHONE (OUTPATIENT)
Dept: OTOLARYNGOLOGY | Facility: CLINIC | Age: 64
End: 2024-09-03

## 2024-09-03 NOTE — TELEPHONE ENCOUNTER
From: Maya Jonas  To: Sandy Graham  Sent: 9/2/2024 8:57 AM CDT  Subject: Lip Appointment     I have to please change my appointment to another day from September 9th. Please call for new date if possible? Thank you, Maya Jonas [phone].

## 2024-09-04 DIAGNOSIS — K13.0 LIP LESION: Primary | ICD-10-CM

## 2024-09-13 NOTE — DISCHARGE INSTRUCTIONS
HOME INSTRUCTIONS      Tips for taking pain medicine  To get the best relief possible, remember these points:   Pain medicines can upset your stomach. Taking them with a little food may help.  Most pain relievers taken by mouth need at least 20 to 30 minutes to start to work.  Don't wait till your pain becomes severe before you take your medicine. Try to time your medicine so that you can take it before starting an activity. This might be before you get dressed, go for a walk, or sit down for dinner.  Constipation is a common side effect of some pain medicines. Call your healthcare provider before taking any medicines such as laxatives or stool softeners to help ease constipation. Also ask if you should skip any foods. Drinking lots of fluids and eating foods such as fruits and vegetables that are high in fiber can also help. Remember, don't take laxatives unless your surgeon has prescribed them.  Drinking alcohol and taking pain medicine can cause dizziness and slow your breathing. It can even be deadly. Don't drink alcohol while taking pain medicine.  Pain medicine can make you react more slowly to things. Don't drive or run machinery while taking pain medicine.  Your healthcare provider may tell you to take acetaminophen to help ease your pain. Ask them how much you're supposed to take each day. Acetaminophen or other pain relievers may interact with your prescription medicines or other over-the-counter (OTC) medicines. Some prescription medicines have acetaminophen and other ingredients in them. Using both prescription and OTC acetaminophen for pain can cause you to accidentally overdose. Read the labels on your OTC medicines with care. This will help you to clearly know the list of ingredients, how much to take, and any warnings. It may also help you not take too much acetaminophen. If you have questions or don't understand the information, ask your pharmacist or healthcare provider to explain it to you before  you take the OTC medicine.       When to call your healthcare provider  Call your healthcare provider right away if you have any of these:     The incision looks different (for instance, part of it opens up).  Bleeding or fluid leaking from the incision site, and weren't told to expect that.  Fever of 100.4°F (38  Call 911°C) or higher, or as directed by your provider.  Call 911 right away if you have:   Trouble breathing  Facial swelling    If you have obstructive sleep apnea   You were given anesthesia medicine during surgery to keep you comfortable and free of pain. After surgery, you may have more apnea spells because of this medicine and other medicines you were given. The spells may last longer than normal.    At home:  Keep using the continuous positive airway pressure (CPAP) device when you sleep. Unless your healthcare provider tells you not to, use it when you sleep, day or night. CPAP is a common device used to treat obstructive sleep apnea.  Talk with your provider before taking any pain medicine, muscle relaxants, or sedatives. Your provider will tell you about the possible dangers of taking these medicines.  Contact your provider if your sleeping changes a lot even when taking medicines as directed.  User [employer] last reviewed this educational content on 10/1/2021  © 5093-6059 [employer]. All rights reserved. This information is not intended as a substitute for professional medical care. Always follow your healthcare professional's instructions.  Try to eat on the right  Tylenol or tylenol #3 for pain  Follow  up in my office in 2 weeks sooner if any problems  Rinse and spit with mouthwash daily  Soft diet for 48 hours

## 2024-09-15 NOTE — H&P
History and Physical     Maya Jonas Patient Status:  Hospital Outpatient Surgery    1960 MRN [id number]   Location [facility] OPERATING ROOM Attending Sandy Graham MD   Hosp Day # 0 PCP Bety Fernandez MD     Chief Complaint: left lower lip lesion    Subjective:    Maya Jonas is a 64 year old female with left lower lip lesion    History/Other:      Past Medical History:  Past Medical History:    Arthritis    Fibromyalgia    Osteoarthritis    Osteopenia        Past Surgical History:   Past Surgical History:   Procedure Laterality Date    Colonoscopy      Hysterectomy            Tonsillectomy         Social History:  reports that she has never smoked. She has never been exposed to tobacco smoke. She has never used smokeless tobacco. She reports that she does not drink alcohol and does not use drugs.    Family History:   Family History   Problem Relation Age of Onset    Other (Other) Mother         RA    Stroke Mother     Cancer Mother     Prostate Cancer Father 80    Heart Disorder Father     Stroke Father     Prostate Cancer Brother 59       Allergies: No Known Allergies    Medications:    No current facility-administered medications on file prior to encounter.     Current Outpatient Medications on File Prior to Encounter   Medication Sig Dispense Refill    cyanocobalamine 1000 MCG Oral Tab Take 1 tablet (1,000 mcg total) by mouth daily.      Vitamin D3 50 MCG (2000 UT) Oral Cap Take 1 capsule (2,000 Units total) by mouth daily. 30 capsule 3    Acetaminophen (TYLENOL OR) 500 mg as needed.           Review of Systems:   A comprehensive 14 point review of systems was completed.    Pertinent positives and negatives noted in the HPI.    Objective:     Ht 5' 6\" (1.676 m)   Wt 127 lb (57.6 kg)   BMI 20.50 kg/m²   General: No acute distress.  Alert ,         HEENT: Normocephalic atraumatic. Moist mucous membranes. EOM-I. PERRLA. Anicteric.  0.6 x 0.8 cm left lower lip lesion  Neck: No  lymphadenopathy. No JVD. No carotid bruits.  Respiratory: Clear to auscultation bilaterally. No wheezes. No rhonchi.    Cardiovascular: S1, S2. Regular rate and rhythm. No murmurs, rubs or gallops. Equal pulses.   Chest and Back: No tenderness or deformity.  Abdomen: Soft, nontender, nondistended.  Positive bowel sounds. No rebound, guarding or organomegaly.  Neurologic: No focal neurological deficits. CNII-XII grossly intact.  Musculoskeletal: Moves all extremities.  Extremities: No edema or cyanosis.  Psychiatric: Appropriate mood and affect.  Integument: No rashes or lesions.   ,         Results:    Labs:    Selected labs - last 24 hours:  Endo  Lytes  Renal   Glu -  Na - Ca -  BUN -   POC Gluc  -  K - PO4 -  Cr -   A1c -  Cl - Mg -  eGFR -   TSH -  CO2 -         LFT  CBC  Other   AST -  WBC -  PTT - Procal -   ALT -  Hb -  INR - CRP -   APk -  Hct -  Trop - D dim -   T anthony -  PLT -  pBNP -  BNP -  - Ferritin  -   Prot -    CK  - Lactate  -   Alb -    LDL  - COVID  -       Imaging: Imaging data reviewed in Epic.    Assessment & Plan:    Impression: 0.6 x 0.8 cm left lower lip lesion  Plan: local excision of left lower lip lesion with primary closure.      Quality:  DVT Mechanical Prophylaxis:        DVT Pharmacologic Prophylaxis   Medication   None                Code Status: Not on file  Phelps: No urinary catheter in place  Phelps Duration (in days):   Central line:    REINALDO:         Sandy Graham MD  9/15/2024    Supplementary Documentation:

## 2024-09-16 ENCOUNTER — HOSPITAL ENCOUNTER (OUTPATIENT)
Facility: HOSPITAL | Age: 64
Setting detail: HOSPITAL OUTPATIENT SURGERY
Discharge: HOME | End: 2024-09-16
Attending: SPECIALIST | Admitting: SPECIALIST
Payer: MEDICARE

## 2024-09-16 ENCOUNTER — TELEPHONE (OUTPATIENT)
Dept: OTOLARYNGOLOGY | Facility: CLINIC | Age: 64
End: 2024-09-16

## 2024-09-16 VITALS
SYSTOLIC BLOOD PRESSURE: 126 MMHG | OXYGEN SATURATION: 98 % | HEART RATE: 68 BPM | DIASTOLIC BLOOD PRESSURE: 80 MMHG | RESPIRATION RATE: 16 BRPM | BODY MASS INDEX: 19.69 KG/M2 | WEIGHT: 122.5 LBS | HEIGHT: 66 IN | TEMPERATURE: 98 F

## 2024-09-16 DIAGNOSIS — K13.0 LIP LESION: Primary | ICD-10-CM

## 2024-09-16 PROCEDURE — 11441 EXC FACE-MM B9+MARG 0.6-1 CM: CPT | Performed by: SPECIALIST

## 2024-09-16 PROCEDURE — 0HB1XZZ EXCISION OF FACE SKIN, EXTERNAL APPROACH: ICD-10-PCS | Performed by: SPECIALIST

## 2024-09-16 RX ORDER — ACETAMINOPHEN AND CODEINE PHOSPHATE 300; 30 MG/1; MG/1
1-2 TABLET ORAL EVERY 4 HOURS PRN
Qty: 20 TABLET | Refills: 0 | Status: SHIPPED | OUTPATIENT
Start: 2024-09-16

## 2024-09-16 RX ORDER — LIDOCAINE HYDROCHLORIDE AND EPINEPHRINE 10; 10 MG/ML; UG/ML
INJECTION, SOLUTION INFILTRATION; PERINEURAL AS NEEDED
Status: DISCONTINUED | OUTPATIENT
Start: 2024-09-16 | End: 2024-09-16 | Stop reason: HOSPADM

## 2024-09-16 NOTE — INTERVAL H&P NOTE
Pre-op Diagnosis: Lip lesion [K13.0]    The above referenced H&P was reviewed by Sandy Graham MD on 9/16/2024, the patient was examined and no significant changes have occurred in the patient's condition since the H&P was performed.  I discussed with the patient and/or legal representative the potential benefits, risks and side effects of this procedure; the likelihood of the patient achieving goals; and potential problems that might occur during recuperation.  I discussed reasonable alternatives to the procedure, including risks, benefits and side effects related to the alternatives and risks related to not receiving this procedure.  We will proceed with procedure as planned.

## 2024-09-16 NOTE — OPERATIVE REPORT
[facility]    PATIENT'S NAME: ANDRES YBARRA   ATTENDING PHYSICIAN: Sandy Graham MD   OPERATING PHYSICIAN: Sandy Graham MD   PATIENT ACCOUNT#:   [id number]    LOCATION:  [address]  MEDICAL RECORD #:   [id number]       YOB: 1960  ADMISSION DATE:       09/16/2024      OPERATION DATE:  09/16/2024    OPERATIVE REPORT      PREOPERATIVE DIAGNOSIS:  Left lower lip lesion.  POSTOPERATIVE DIAGNOSIS:  Left lower lip lesion.  PROCEDURE:  Local excision of left lower lip lesion with primary closure.    ANESTHESIA:  Local with a total of 0.5 mL of 1% lidocaine with 1:100,000 epinephrine used.    ESTIMATED BLOOD LOSS:  3 mL.    COMPLICATIONS:  None.      SPECIMEN:  Lower lip lesion.    INDICATIONS:  This is a 64-year-old female who has had a left lower lip lesion which appears to be getting bigger.  It is just anterior to the junction of the lip and oral vestibule.  For the above-mentioned reason, the procedure was indicated.    OPERATIVE TECHNIQUE:  Patient was taken to the operating room suite.  She was sterilely prepped and draped.  A total of 0.5 mL of 1% lidocaine with 1:100,000 epinephrine was infiltrated underneath the area which was marked with a marking pen.  After this was done, a 15 blade was used to incise around the lesion, followed by a curved iris scissors.  A bipolar cautery was used for hemostasis.  Five 4-0 Vicryl sutures were used to close the area.  The specimen was sent for permanent pathologic diagnosis.  The patient tolerated the procedure well.  Her lip movement was normal.  She was taken back to the same-day surgery in good condition.    Dictated By Sandy Graham MD  d: 09/16/2024 14:11:02  t: 09/16/2024 18:54:40  [facility] [id number]  [facility] [place]

## 2024-09-16 NOTE — BRIEF OP NOTE
Pre-Operative Diagnosis: Lip lesion [K13.0]     Post-Operative Diagnosis: Lip lesion [K13.0]      Procedure Performed:   Excision and closure of left lower lip lesion    Surgeons and Role:     * Sandy Graham MD - Primary    Assistant(s):        Surgical Findings: excision of lower lip lesion with primary closure     Specimen: lower lip lesion     Estimated Blood Loss: 3 mL    Dictation Number:  [id number]    Sandy Graham MD  9/16/2024  2:08 PM

## 2024-09-17 ENCOUNTER — TELEPHONE (OUTPATIENT)
Dept: OTOLARYNGOLOGY | Facility: CLINIC | Age: 64
End: 2024-09-17

## 2024-09-17 NOTE — TELEPHONE ENCOUNTER
Post op day #1 excision and closure of left lower lip lesion    Per patient, is feeling well, eating on right side of mouth, drinking well, pain is well controlled with tylenol #3,  No bleeding, no drainage, sutures in place and are dissolvable, slight swelling to area, scant dried blood noted. Post op appt made, instructed to call office for any bleeding, increasing swelling, unusual drainage,  Uncontrolled pain, fever over 101 F, or any concerns.  Reinforced Post op instructions as follows:Try to eat on the right,Tylenol or tylenol #3 for pain, Follow up in my office in 2 weeks sooner if any problems, Rinse and spit with mouthwash daily, Soft diet for 48 hours.  Future Appointments   Date Time Provider Department Center   9/23/2024  9:00 AM Shanna Muller MD [facility]   9/30/2024  7:10 AM Sandy Graham MD [facility]   10/28/2024  9:00 AM Bety Fernandez MD [facility]

## 2024-09-19 ENCOUNTER — LAB ENCOUNTER (OUTPATIENT)
Dept: LAB | Facility: HOSPITAL | Age: 64
End: 2024-09-19
Attending: INTERNAL MEDICINE
Payer: MEDICARE

## 2024-09-19 DIAGNOSIS — E55.9 VITAMIN D DEFICIENCY: ICD-10-CM

## 2024-09-19 DIAGNOSIS — M81.0 AGE-RELATED OSTEOPOROSIS WITHOUT CURRENT PATHOLOGICAL FRACTURE: Primary | ICD-10-CM

## 2024-09-19 LAB
ALBUMIN SERPL-MCNC: 4.5 G/DL (ref 3.2–4.8)
ALBUMIN/GLOB SERPL: 1.6 {RATIO} (ref 1–2)
ALP LIVER SERPL-CCNC: 59 U/L
ALT SERPL-CCNC: 8 U/L
ANION GAP SERPL CALC-SCNC: 6 MMOL/L (ref 0–18)
AST SERPL-CCNC: 16 U/L (ref ?–34)
BILIRUB SERPL-MCNC: 0.6 MG/DL (ref 0.2–1.1)
BUN BLD-MCNC: 10 MG/DL (ref 9–23)
BUN/CREAT SERPL: 15.2 (ref 10–20)
CALCIUM BLD-MCNC: 9.2 MG/DL (ref 8.7–10.4)
CHLORIDE SERPL-SCNC: 109 MMOL/L (ref 98–112)
CHOLEST SERPL-MCNC: 242 MG/DL (ref ?–200)
CO2 SERPL-SCNC: 28 MMOL/L (ref 21–32)
CREAT BLD-MCNC: 0.66 MG/DL
DEPRECATED RDW RBC AUTO: 44.3 FL (ref 35.1–46.3)
EGFRCR SERPLBLD CKD-EPI 2021: 97 ML/MIN/1.73M2 (ref 60–?)
ERYTHROCYTE [DISTWIDTH] IN BLOOD BY AUTOMATED COUNT: 12.7 % (ref 11–15)
FASTING PATIENT LIPID ANSWER: YES
FASTING STATUS PATIENT QL REPORTED: YES
GLOBULIN PLAS-MCNC: 2.8 G/DL (ref 2–3.5)
GLUCOSE BLD-MCNC: 86 MG/DL (ref 70–99)
HCT VFR BLD AUTO: 40.3 %
HDLC SERPL-MCNC: 80 MG/DL (ref 40–59)
HGB BLD-MCNC: 13.7 G/DL
LDLC SERPL CALC-MCNC: 139 MG/DL (ref ?–100)
MCH RBC QN AUTO: 32.2 PG (ref 26–34)
MCHC RBC AUTO-ENTMCNC: 34 G/DL (ref 31–37)
MCV RBC AUTO: 94.8 FL
NONHDLC SERPL-MCNC: 162 MG/DL (ref ?–130)
OSMOLALITY SERPL CALC.SUM OF ELEC: 294 MOSM/KG (ref 275–295)
PLATELET # BLD AUTO: 370 10(3)UL (ref 150–450)
POTASSIUM SERPL-SCNC: 4.1 MMOL/L (ref 3.5–5.1)
PROT SERPL-MCNC: 7.3 G/DL (ref 5.7–8.2)
PTH-INTACT SERPL-MCNC: 90.8 PG/ML (ref 18.5–88)
RBC # BLD AUTO: 4.25 X10(6)UL
SODIUM SERPL-SCNC: 143 MMOL/L (ref 136–145)
TRIGL SERPL-MCNC: 130 MG/DL (ref 30–149)
TSI SER-ACNC: 0.96 MIU/ML (ref 0.55–4.78)
VIT D+METAB SERPL-MCNC: 32.9 NG/ML (ref 30–100)
VLDLC SERPL CALC-MCNC: 24 MG/DL (ref 0–30)
WBC # BLD AUTO: 4.7 X10(3) UL (ref 4–11)

## 2024-09-19 PROCEDURE — 83970 ASSAY OF PARATHORMONE: CPT | Performed by: INTERNAL MEDICINE

## 2024-09-19 PROCEDURE — 80061 LIPID PANEL: CPT | Performed by: INTERNAL MEDICINE

## 2024-09-19 PROCEDURE — 84443 ASSAY THYROID STIM HORMONE: CPT | Performed by: INTERNAL MEDICINE

## 2024-09-19 PROCEDURE — 82306 VITAMIN D 25 HYDROXY: CPT

## 2024-09-19 PROCEDURE — 84080 ASSAY ALKALINE PHOSPHATASES: CPT

## 2024-09-19 PROCEDURE — 36415 COLL VENOUS BLD VENIPUNCTURE: CPT

## 2024-09-19 PROCEDURE — 85027 COMPLETE CBC AUTOMATED: CPT | Performed by: INTERNAL MEDICINE

## 2024-09-19 PROCEDURE — 80053 COMPREHEN METABOLIC PANEL: CPT | Performed by: INTERNAL MEDICINE

## 2024-09-23 ENCOUNTER — OFFICE VISIT (OUTPATIENT)
Dept: ENDOCRINOLOGY CLINIC | Facility: CLINIC | Age: 64
End: 2024-09-23
Payer: MEDICARE

## 2024-09-23 ENCOUNTER — TELEPHONE (OUTPATIENT)
Dept: OTOLARYNGOLOGY | Facility: CLINIC | Age: 64
End: 2024-09-23

## 2024-09-23 VITALS
SYSTOLIC BLOOD PRESSURE: 119 MMHG | DIASTOLIC BLOOD PRESSURE: 70 MMHG | BODY MASS INDEX: 19.93 KG/M2 | HEART RATE: 90 BPM | HEIGHT: 66 IN | WEIGHT: 124 LBS

## 2024-09-23 DIAGNOSIS — M81.0 AGE-RELATED OSTEOPOROSIS WITHOUT CURRENT PATHOLOGICAL FRACTURE: Primary | ICD-10-CM

## 2024-09-23 DIAGNOSIS — E55.9 VITAMIN D DEFICIENCY: ICD-10-CM

## 2024-09-23 NOTE — PROGRESS NOTES
FU VISIT:     CHIEF COMPLAINT:    Chief Complaint   Patient presents with    Osteoporosis     Pt is in for a Osteoporosis follow up. No recent falls/trips No fracture or broken bones No recent dental work        HISTORY OF PRESENT ILLNESS:   Maya Jonas is a 64 year old female who presents Osteoporosis.      Diagnosed on routine DXA after hysterectomy about 20 years ago    She has been on fosamax and reclast in the past  Received Prolia, first injection received Aug 2019. Next dose 2020, Sep 2020, 2021. She did not get any further injections, can not recall why  Started evenity Aug 2022- Aug 2023, prolia in Sep 2023, 2024    Fractures:   2022 : rib fractures. She bent to  something and developed pain, x rays confirmed rib fractures    CT 2022:   Moderate T6 and mild-to-moderate T7 vertebral compression fracture.  Minimal T8 superior endplate compression fracture.      Also noted h/o patellar fracture on Epic      No fall, no fractures       Calcium and Vit d intake: vit D 3000 units daily  Steroid use,  PPI use, chronic pain medication use, Anti-epileptics use: denies  RA: no  Exercise: no  She is postmenopausal  Maternal history of osteoporosis Yes  History of fractures: denies  History of kidney stones No     Smoking No   Alcohol No  History of thyroid disease No   History of calcium problems: No   History of Malabsorption: No     No h/o radiation  No extensive dental work planned in the future  + acid reflux, on medication     Doing well.     PAST MEDICAL HISTORY:   Past Medical History:    Arthritis    Fibromyalgia    Osteoarthritis    Osteopenia       PAST SURGICAL HISTORY:   Past Surgical History:   Procedure Laterality Date    Colonoscopy      Hysterectomy            Tonsillectomy         CURRENT MEDICATIONS:    Current Outpatient Medications   Medication Sig Dispense Refill    Vitamin D3 50 MCG (2000 UT) Oral Cap Take 1 capsule (2,000 Units total) by mouth daily. 30  capsule 3    acetaminophen-codeine 300-30 MG Oral Tab Take 1-2 tablets by mouth every 4 (four) hours as needed for Pain. 20 tablet 0    cyanocobalamine 1000 MCG Oral Tab Take 1 tablet (1,000 mcg total) by mouth daily.      Acetaminophen (TYLENOL OR) 500 mg as needed.           ALLERGIES:  No Known Allergies    SOCIAL HISTORY:    Social History     Socioeconomic History    Marital status: Single   Tobacco Use    Smoking status: Never     Passive exposure: Never    Smokeless tobacco: Never   Vaping Use    Vaping status: Never Used   Substance and Sexual Activity    Alcohol use: No    Drug use: No   Other Topics Concern    Caffeine Concern Yes     Comment: coffee daily    Exercise No       FAMILY HISTORY:   Family History   Problem Relation Age of Onset    Other (Other) Mother         RA    Stroke Mother     Cancer Mother     Prostate Cancer Father 80    Heart Disorder Father     Stroke Father     Prostate Cancer Brother 59       ASSESSMENTS:     REVIEW OF SYSTEMS:  Constitutional: Negative for: weight change, fever, fatigue, cold/heat intolerance  Eyes: Negative for:  Visual changes, proptosis, blurring  ENT: Negative for:  dysphagia, neck swelling, dysphonia  Respiratory: Negative for:  dyspnea, cough  Cardiovascular: Negative for:  chest pain, palpitations, orthopnea  GI: Negative for:  abdominal pain, nausea, vomiting, diarrhea, constipation, bleeding  Neurology: Negative for: headache, numbness, weakness  Genito-Urinary: Negative for: dysuria, frequency  Psychiatric: Negative for:  depression, anxiety  Hematology/Lymphatics: Negative for: bruising, lower extremity edema  Endocrine: Negative for: polyuria, polydypsia  Skin: Negative for: rash, blister, cellulitis      PHYSICAL EXAM:   Vitals:    09/23/24 0854   BP: 119/70   Pulse: 90   Weight: 124 lb (56.2 kg)   Height: 5' 6\" (1.676 m)     BMI: Body mass index is 20.01 kg/m².       General Appearance:  alert, well developed, in no acute distress  Head:  Atraumatic  Eyes:  normal conjunctivae, sclera., normal sclera and normal pupils  Throat/Neck: normal sound to voice. Normal hearing, normal speech  Respiratory:  Speaking in full sentences, non-labored. no increased work of breathing, no audible wheezing    Neuro: motor grossly intact, moving all extremities without difficulty  Psychiatric:  oriented to time, self, and place  Extremities: no obvious extremity swelling, no lesions        DATA:     Pertinent data reviewed    DXA April 2024: shows improvement in BMD      ASSESSMENT AND PLAN:    Patient is a 64 year old female with:      Osteoporosis with h/o fragility fractures.   Discussed Osteoporosis is detail including pathogenesis, risk factors and treatment options.    Given that she ha trid anti resporptiv treatments in the past and also ha a h/o fragility fractures, hence she was started on evenity  Aug 1825-7567, prolia in Sep 2023, march 2024,     - Labs  Reviewed  - Prolia third injection of this cycle today    - Vit ID is normal 3000 units daily  - Discussed dietary intake of calcium  - Resistance exercises  - Fall precautions.     2. Vitamin D deficiency     - Vit ID is normal, on 3000 units daily   CPM    3. High PTH  Calcium is normal  I reviewed the following:  PTH-calcium axis  She will hydrate well since increased serum calcium can  increase risk of kidney stones.   I have reviewed the implications of elevated PTH which include worsening of bones       RTC in 6 months with me.     Shanna Muller MD        Patient verbalized a complete  understanding of all of the above and did not have any further questions.    Labs: Calcium, vitamin D, parathyroid hormone

## 2024-09-23 NOTE — TELEPHONE ENCOUNTER
Called patient back, she had lip excision done on 09/16/24. States she has one suture left  from dissolvable stitches on very end. States no bleeding, no pus, no fever,  no redness/warmth. Instructed her not to try and pull it out, she states it is annoying and she was concerned why it was not falling out yet. Patient instructed not to try and pull out stitch on her own. Let her know can take up to a couple weeks to fully absorb, patient is currently scheduled for 2 week follow up on 9/30/24.Patient wanted to know if you maybe wanted to evaluate her a couple days earlier?

## 2024-09-23 NOTE — TELEPHONE ENCOUNTER
Per patient had surgery a week ago, states she still has one stitch left, states it is bothering her, she tried to tug it but wont come out. Please call thank you.

## 2024-09-24 LAB — ALKALINE PHOSPHATASE BONE SPECIFIC: 8.1 UG/L

## 2024-09-26 ENCOUNTER — OFFICE VISIT (OUTPATIENT)
Dept: OTOLARYNGOLOGY | Facility: CLINIC | Age: 64
End: 2024-09-26
Payer: MEDICARE

## 2024-09-26 VITALS — BODY MASS INDEX: 19.93 KG/M2 | HEIGHT: 66 IN | WEIGHT: 124 LBS

## 2024-09-26 DIAGNOSIS — K13.0 LIP LESION: Primary | ICD-10-CM

## 2024-09-26 PROCEDURE — 99024 POSTOP FOLLOW-UP VISIT: CPT | Performed by: SPECIALIST

## 2024-09-26 NOTE — PATIENT INSTRUCTIONS
1 suture was removed from your left lower lip.  As you know this was a fibroma.  Follow-up with any additional questions or problems.

## 2024-09-26 NOTE — PROGRESS NOTES
Operative day #10  Patient's status post removal of left lower lip lesion.  Would like the 1 remaining dissolvable suture removed.  Physical examination:  Area well-healed 1 Vicryl suture still in place.  Removed without difficulty.  Area healing nicely.  Patient is aware that there is a separate fibroma posterior on the left oral vestibule area.  Impression: Essentially healed area.  Plan: Follow-up with any additional questions or problems.

## 2024-10-28 ENCOUNTER — OFFICE VISIT (OUTPATIENT)
Dept: INTERNAL MEDICINE CLINIC | Facility: CLINIC | Age: 64
End: 2024-10-28
Payer: MEDICARE

## 2024-10-28 VITALS
HEART RATE: 68 BPM | HEIGHT: 66 IN | SYSTOLIC BLOOD PRESSURE: 132 MMHG | DIASTOLIC BLOOD PRESSURE: 85 MMHG | WEIGHT: 121.31 LBS | BODY MASS INDEX: 19.5 KG/M2

## 2024-10-28 DIAGNOSIS — D69.2 SENILE PURPURA (HCC): ICD-10-CM

## 2024-10-28 DIAGNOSIS — S82.042D CLOSED DISPLACED COMMINUTED FRACTURE OF LEFT PATELLA WITH ROUTINE HEALING, SUBSEQUENT ENCOUNTER: ICD-10-CM

## 2024-10-28 DIAGNOSIS — Z28.21 IMMUNIZATION NOT CARRIED OUT BECAUSE OF PATIENT REFUSAL: ICD-10-CM

## 2024-10-28 DIAGNOSIS — M12.9 ARTHROPATHY: ICD-10-CM

## 2024-10-28 DIAGNOSIS — Z12.31 SCREENING MAMMOGRAM, ENCOUNTER FOR: ICD-10-CM

## 2024-10-28 DIAGNOSIS — Z12.11 COLON CANCER SCREENING: ICD-10-CM

## 2024-10-28 DIAGNOSIS — M80.00XD AGE-RELATED OSTEOPOROSIS WITH CURRENT PATHOLOGICAL FRACTURE WITH ROUTINE HEALING: ICD-10-CM

## 2024-10-28 DIAGNOSIS — Z00.00 ENCOUNTER FOR ANNUAL HEALTH EXAMINATION: Primary | ICD-10-CM

## 2024-10-28 DIAGNOSIS — I77.1 TORTUOUS AORTA (HCC): ICD-10-CM

## 2024-10-28 DIAGNOSIS — K21.9 CHRONIC GERD: ICD-10-CM

## 2024-10-28 PROBLEM — S82.002A LEFT PATELLA FRACTURE: Status: RESOLVED | Noted: 2019-05-16 | Resolved: 2024-10-28

## 2024-10-28 PROCEDURE — 90670 PCV13 VACCINE IM: CPT | Performed by: INTERNAL MEDICINE

## 2024-10-28 PROCEDURE — G0009 ADMIN PNEUMOCOCCAL VACCINE: HCPCS | Performed by: INTERNAL MEDICINE

## 2024-10-28 PROCEDURE — G0402 INITIAL PREVENTIVE EXAM: HCPCS | Performed by: INTERNAL MEDICINE

## 2024-10-28 NOTE — PROGRESS NOTES
Subjective:   Maya Jonas is a 64 year old female who presents for a Medicare Initial Preventative Physical Exam (Welcome to Medicare- < 12 months on Medicare) and scheduled follow up of multiple significant but stable problems.   Patient comes for her very first Medicare physical  Overall doing well but a little stressed because her dad is in the hospital due to multiple falls and a possible stroke and she lives with him and he is 95  Seeing endo Patient is now getting her Prolia shots every 6 months    HISTORY   still has back pain -increases with bad weather - started prolia   Saw dr thompson the podiatrist and rec   conservative treatment such as shoe wear modification, avoiding walking barefoot, vitamin b12 supplementation, however if that fails, may need surgical intervention to try and increase im angle to decrease pressure on the nerve. patient understands, along with possible exostosis removal of midfoot   Was rec [facility] and [facility]     Stopped meds for the pain bc it would hurt her stomach and will see gi dr raya in dec      HISTORY  numbness in the right toes , had bunion surg 8 yrs ago and also had hammer tie surg but the bone that was hurting is back-- started before stopping the lyrica      HISTORY  -GI-dr raya-trial of Prilosec 20 mg once a day and CT abdomen and pelvis but per insurance has to do the ultrasound for states she will get that done tomorrow  Back still hurts and she sees dr gagnon   Has shooting pain going down her right leg for a month  No falls trauma or injury  +lower back   Has not really tried anything except Tylenol which helps a little bit  Better ?  Worse -sitting up from lying down position        HISTORY  New pt -used to see dr heard who passed away   C/c establish care -reffered by PT Carmela   C/o annual physical   has fractured ribs from OP , going to physical therapy for back pain  Had been on fosamax 15 yrs ago now on romosozumab           PMH  Compression fracture in her spine  -sees dr daren gagnon   OP- dr freire [place] inj -romosozumab/evenity(has been on Fosamax and Reclast in the past)  gerd   Arthritis - sees dr GRAVES and dr gagnon   Lumbar stenosis on MRI 6/2023     Lives with mom and dad who she takes of -in their 90s   Retired , used to work in the [employer]    ----------------------------------------------------------------------------------------------------    History/Other:   Fall Risk Assessment:   She has been screened for Falls and is low risk.      Cognitive Assessment:   She had a completely normal cognitive assessment - see flowsheet entries     Functional Ability/Status:   Maya Jonas has some abnormal functions as listed below:  She has difficulties Affording Meds based on screening of functional status. She has Vision problems based on screening of functional status. She has problems with Daily Activities based on screening of functional status.       Depression Screening (PHQ):  PHQ-9 TOTAL SCORE: 3  , done 10/28/2024   Little interest or pleasure in doing things: 1    Feeling down, depressed, or hopeless: 2            Advanced Directives:   She does NOT have a Living Will. [Do you have a living will?: No]  She does NOT have a Power of  for Health Care. [Do you have a healthcare power of ?: No]  Discussed Advance Care Planning with patient (and family/surrogate if present). Standard forms made available to patient in After Visit Summary.      Patient Active Problem List   Diagnosis    Arthropathy    Closed displaced comminuted fracture of left patella with routine healing, subsequent encounter    Chronic GERD    Age-related osteoporosis with current pathological fracture with routine healing    Tortuous aorta (HCC)     Allergies:  She has No Known Allergies.    Current Medications:  Outpatient Medications Marked as Taking for the 10/28/24 encounter (Office Visit) with Bety Fernandez MD   Medication Sig    cyanocobalamine 1000 MCG Oral  Tab Take 1 tablet (1,000 mcg total) by mouth daily.    Vitamin D3 50 MCG (2000 UT) Oral Cap Take 1 capsule (2,000 Units total) by mouth daily.    Acetaminophen (TYLENOL OR) 500 mg as needed.         Medical History:  She  has a past medical history of Arthritis, Fibromyalgia, Osteoarthritis, and Osteopenia.  Surgical History:  She  has a past surgical history that includes hysterectomy; colonoscopy; ; tonsillectomy; and other surgical history (2024).   Family History:  Her family history includes Cancer in her mother; Heart Disorder in her father; Other in her mother; Prostate Cancer (age of onset: 59) in her brother; Prostate Cancer (age of onset: 80) in her father; Stroke in her father and mother.  Social History:  She  reports that she has never smoked. She has never been exposed to tobacco smoke. She has never used smokeless tobacco. She reports that she does not drink alcohol and does not use drugs.    Tobacco:  She has never smoked tobacco.    CAGE Alcohol Screen:   CAGE screening score of 0 on 10/28/2024, showing low risk of alcohol abuse.      Patient Care Team:  Bety Fernandez MD as PCP - General (Internal Medicine)  Carmela Izquierdo PT as Physical Therapist (Physical Therapy)    Review of Systems  GENERAL: feels well otherwise  SKIN: denies any unusual skin lesions  EYES: denies blurred vision or double vision  HEENT: denies nasal congestion, sinus pain or ST  LUNGS: denies shortness of breath with exertion  CARDIOVASCULAR: denies chest pain on exertion  GI: denies abdominal pain, denies heartburn  : denies dysuria, vaginal discharge or itching, no complaint of urinary incontinence  MUSCULOSKELETAL: denies back pain  NEURO: denies headaches  PSYCHE: denies depression or anxiety  HEMATOLOGIC: denies hx of anemia  ENDOCRINE: denies thyroid history  ALL/ASTHMA: denies hx of allergy or asthma    Objective:   Physical Exam  GENERAL: well developed, well nourished,in no apparent distress  SKIN: no  rashes,no suspicious lesions  HEENT: atraumatic, normocephalic,ears and throat are clear, no frontal or maxillary sinus tenderness, pupils equal and reactive to light bilaterally, extraocular muscles intact  NECK: supple,no adenopathy,nontender   LUNGS: clear to auscultation, no wheeze  CARDIO: RRR without murmur  GI: good BS's,no masses or tenderness  EXTREMITIES: no cyanosis, or edema  BREAST: Bilateral breasts without any lumps, bilateral axilla without any lymphadenopathy, no nipple retraction or  discharge      /85   Pulse 68   Ht 5' 6\" (1.676 m)   Wt 121 lb 4.8 oz (55 kg)   BMI 19.58 kg/m²  Estimated body mass index is 19.58 kg/m² as calculated from the following:    Height as of this encounter: 5' 6\" (1.676 m).    Weight as of this encounter: 121 lb 4.8 oz (55 kg).    Medicare Hearing Assessment:   Hearing Screening    Screening Method: Questionnaire  I have a problem hearing over the telephone: No I have trouble following the conversations when two or more people are talking at the same time: No   I have trouble understanding things on the TV: No I have to strain to understand conversations: No   I have to worry about missing the telephone ring or doorbell: No I have trouble hearing conversations in a noisy background such as a crowded room or restaurant: No   I get confused about where sounds come from: No I misunderstand some words in a sentence and need to ask people to repeat themselves: No   I especially have trouble understanding the speech of women and children: No I have trouble understanding the speaker in a large room such as at a meeting or place of Faith: No   Many people I talk to seem to mumble (or don't speak clearly): No People get annoyed because I misunderstand what they say: No   I misunderstand what others are saying and make inappropriate responses: No I avoid social activities because I cannot hear well and fear I will reply improperly: No   Family members and friends have  told me they think I may have hearing loss: No             Visual Acuity:   Right Eye Visual Acuity: Corrected Right Eye Chart Acuity: 20/40   Left Eye Visual Acuity: Corrected Left Eye Chart Acuity: 20/30   Both Eyes Visual Acuity: Corrected Both Eyes Chart Acuity: 20/25   Able To Tolerate Visual Acuity: Yes        Assessment & Plan:   Maya Jonas is a 64 year old female who presents for a Medicare Assessment.     1. Encounter for annual health examination (Primary)  Advised patient to watch what she eats and  exercise, seatbelt use no texting driving, sunscreen use advised    2. Immunization not carried out because of patient refusal  Stable   3. Age-related osteoporosis with current pathological fracture with routine healing  Stable on prolia   4. Closed displaced comminuted fracture of left patella with routine healing, subsequent encounter  Stable   5. Arthropathy  Stable   6. Chronic GERD  Overview:  530.81 2019-04-23 Chronic GERD Active 679121664 K21.9 [id number]  Stable off meds   7. Senile purpura (HCC)  Stable   8. Screening mammogram, encounter for  -     [facility] 2D+3D SCREENING BILAT (CPT=77067/75075); Future; Expected date: 10/28/2024  \ordered   9. Colon cancer screening  -     [facility] - INTERNAL  Refer   Other orders  -     PNEUMOCOCCAL VACC, 13 VIKY IM  Today    Tortuous aorta   Stable           Preventative medicine   cscope -dr augie raya- in [place] - had both egd and cscope 5 yrs -ie 2026 4/2023 egd  Mammogram -6/2024  Pap- hysterectomy   dexa 4/23/2022 -OP left hip mild , severe in lumbar spine   Labs 1/2023 and 7/2023 , aware to check if [facility] has to [facility]     The patient indicates understanding of these issues and agrees to the plan.  Reinforced healthy diet, lifestyle, and exercise.      No follow-ups on file.     Bety Fernandez MD, 10/28/2024     Supplementary Documentation:   General Health:  In the past six months, have you lost more than 10 pounds without trying?:  3 - Don't know  Has your appetite been poor?: No  Type of Diet: Vegetarian  How does the patient maintain a good energy level?: Daily Walks  How would you describe your daily physical activity?: Light  How would you describe your current health state?: Fair  How do you maintain positive mental well-being?: Visiting Friends  On a scale of 0 to 10, with 0 being no pain and 10 being severe pain, what is your pain level?: 6 - (Moderate)  In the past six months, have you experienced urine leakage?: 0-No  At any time do you feel concerned for the safety/well-being of yourself and/or your children, in your home or elsewhere?: No  Have you had any immunizations at another office such as Influenza, Hepatitis B, Tetanus, or Pneumococcal?: No    Health Maintenance   Topic Date Due    Pneumococcal Vaccine: 65+ Years (1 of 1 - PCV) Never done    COVID-19 Vaccine (6 - 2023-24 season) 09/01/2024    Annual Physical  10/14/2024    Zoster Vaccines (1 of 2) 11/28/2024 (Originally 4/17/2009)    Influenza Vaccine (1) 06/30/2025 (Originally 10/1/2024)    Mammogram  06/24/2025    Colorectal Cancer Screening  09/27/2025    DEXA Scan  Completed    Annual Depression Screening  Completed    Fall Risk Screening (Annual)  Completed

## 2024-11-03 PROBLEM — I77.1 TORTUOUS AORTA: Status: ACTIVE | Noted: 2024-11-03

## 2024-11-03 PROBLEM — I77.1 TORTUOUS AORTA (HCC): Status: ACTIVE | Noted: 2024-11-03

## 2025-01-03 ENCOUNTER — TELEPHONE (OUTPATIENT)
Dept: INTERNAL MEDICINE CLINIC | Facility: CLINIC | Age: 65
End: 2025-01-03

## 2025-01-03 NOTE — TELEPHONE ENCOUNTER
Verified name and .    Patient reports urinary symptoms- asking for antibiotic prescription.      She was advised that evaluation is required- no openings in office.    She was advised to go to the walk-in clinic or urgent care.    Patient states she will go to walk-in clinic in [place].    Information provided:  [place] [facility]  [address] [place] [id number]

## 2025-01-22 ENCOUNTER — TELEPHONE (OUTPATIENT)
Facility: CLINIC | Age: 65
End: 2025-01-22

## 2025-01-22 ENCOUNTER — OFFICE VISIT (OUTPATIENT)
Facility: CLINIC | Age: 65
End: 2025-01-22
Payer: MEDICARE

## 2025-01-22 VITALS
BODY MASS INDEX: 19.56 KG/M2 | HEIGHT: 66 IN | SYSTOLIC BLOOD PRESSURE: 123 MMHG | HEART RATE: 71 BPM | DIASTOLIC BLOOD PRESSURE: 85 MMHG | WEIGHT: 121.69 LBS

## 2025-01-22 DIAGNOSIS — Z12.11 COLON CANCER SCREENING: Primary | ICD-10-CM

## 2025-01-22 DIAGNOSIS — R10.10 PAIN OF UPPER ABDOMEN: Primary | ICD-10-CM

## 2025-01-22 PROCEDURE — 99203 OFFICE O/P NEW LOW 30 MIN: CPT | Performed by: INTERNAL MEDICINE

## 2025-01-22 RX ORDER — SACCHAROMYCES BOULARDII 250 MG
250 CAPSULE ORAL 2 TIMES DAILY
Qty: 60 CAPSULE | Refills: 0 | Status: SHIPPED | OUTPATIENT
Start: 2025-01-22

## 2025-01-22 RX ORDER — SODIUM, POTASSIUM,MAG SULFATES 17.5-3.13G
SOLUTION, RECONSTITUTED, ORAL ORAL
Qty: 1 EACH | Refills: 0 | Status: SHIPPED | OUTPATIENT
Start: 2025-01-22

## 2025-01-22 NOTE — PROGRESS NOTES
Maya Jonas is a 64 year old female.    HPI:     Chief Complaint   Patient presents with    Consult     For Continuing care/ new patient   Maya is a 64-year-old female who has a history of compression fracture of the thoracic vertebrae, arthritis, fibromyalgia who is seen today in the office for gastroenterology consultation.    The patient reports she has had symptoms of gas and bloating as well as reflux.  This has been ongoing for the last several years.  She did undergo upper endoscopy by Dr. Jennifer ARCE about 1 year ago.  Findings of procedure showed minimal esophagitis and mild gastritis.  Biopsies of the stomach showed mild chronic gastritis which were negative for H. pylori.  Biopsies of the distal esophagus showed mild chronic nonspecific esophagitis.    She reports her bowels are regular and she goes about 2-3 times a day.    She is not using milk, she does use almond milk.  There is no family history of Crohn's disease.    HISTORY:  Past Medical History:    Arthritis    Fibromyalgia    Osteoarthritis    Osteopenia      Past Surgical History:   Procedure Laterality Date    Colonoscopy      Hysterectomy            Other surgical history  2024    Local excision of left lower lip lesion with primary closure.    Tonsillectomy      Total abdom hysterectomy        Family History   Problem Relation Age of Onset    Other (Other) Mother         RA    Stroke Mother     Cancer Mother         Stomach    Prostate Cancer Father 80    Heart Disorder Father     Stroke Father     Cancer Father         Prostate    Prostate Cancer Brother 59    Cancer Brother       Social History:   Social History     Socioeconomic History    Marital status: Single   Tobacco Use    Smoking status: Never     Passive exposure: Never    Smokeless tobacco: Never   Vaping Use    Vaping status: Never Used   Substance and Sexual Activity    Alcohol use: No    Drug use: No   Other Topics Concern    Caffeine Concern Yes     Comment:  coffee daily    Exercise No   Social History Narrative    The patient does not use an assistive device..      The patient does live in a home with stairs.     Social Drivers of Health      [facility] [employer]    [facility]        Medications (Active prior to today's visit):  Current Outpatient Medications   Medication Sig Dispense Refill    cyanocobalamine 1000 MCG Oral Tab Take 1 tablet (1,000 mcg total) by mouth daily.      Vitamin D3 50 MCG (2000 UT) Oral Cap Take 1 capsule (2,000 Units total) by mouth daily. 30 capsule 3    Acetaminophen (TYLENOL OR) 500 mg as needed.           Allergies:  Allergies[1]      ROS:   The patient denies any chest pain or shortness of breath,  No neurologic or dermatologic symptoms.     PHYSICAL EXAM:   Blood pressure 123/85, pulse 71, height 5' 6\" (1.676 m), weight 121 lb 11.2 oz (55.2 kg), not currently breastfeeding.    The patient appears their stated age and is in no acute distress  HEENT- anicteric sclera, neck no lymphadnopathy, OP- clear with no masses or lesions  Chest- Clear bilaterally, no wheezing,  Heart- regular rate, no murmur or gallop  Abdomen- Soft and nontender, nondistended  Ext- no clubbing or cyanosis  Skin- no rashes or lesions  Neuro- appropriate response, alert, no confusion  .  ASSESSMENT/PLAN:   Assessment     Discussed patient's symptoms of gas and bloating-she is on a high-fiber diet and discussed a trial of Florastor probiotic as well as IBgard.    Additionally, the patient will be set up for colonoscopy for colon cancer screening, Suprep and MAC sedation as outlined.  Risks and benefits discussed and patient is agreeable to proceeding.    Plan  Colonoscopy for colon cancer screening  -Suprep and MAC sedation    Gas and bloating  - likely from high fiber diet  - trial of Florastor probiotic x 1 month  - IBgard to take as needed with meals for bloating     Orders This Visit:  No orders of the defined types were placed in this  encounter.      Meds This Visit:  Requested Prescriptions      No prescriptions requested or ordered in this encounter       Imaging & Referrals:  None       Keo Aponte MD  [facility]              [1] No Known Allergies

## 2025-01-22 NOTE — TELEPHONE ENCOUNTER
Patient was seen in office today with Dr. Aponte and was given orders to schedule. Please call patient to schedule his/her procedure with the orders given below. Patient was given the phone number and prep instructions at the time of the office visit. The prep instructions was also explained to the patient at the time of the visit. The patient verbalized understanding the prep and that a GI  will call him/her for the procedure.  If patient calls before the 1 week turnaround please schedule with the orders given below, thank you!    Orders from Dr. Aponte        Instructions    Colonoscopy for colon cancer screening  -Suprep and MAC sedation     Gas and bloating  - likely from high fiber diet  - trial of Florastor probiotic x 1 month  - IBgard to take as needed with meals for bloating

## 2025-01-24 NOTE — TELEPHONE ENCOUNTER
Scheduled for: colonoscopy [id number]    Provider Name: Dr. Aponte  Date: Thurs 5/22/2025   Location: [facility]   Sedation: MAC    Time: 12:30 pm, (pt is aware that [facility] will call the day before to confirm arrival time)  Prep: split suprep   Meds/Allergies Reconciled?: NKDA    Diagnosis with codes: colon screening Z12.11   Was patient informed to call insurance with codes (Y/N): Yes    Referral sent?: Yes, Medicare/Medicaid  EM or [facility] notified?: I sent an electronic request to Endo Scheduling and received a confirmation today.     Medication Orders: Patient is aware to NOT take iron pills, herbal meds and diet supplements for 7 days before exam. Also to NOT take any form of alcohol, recreational drugs and any forms of ED meds 24-72 hours before exam.      Misc Orders:       Further instructions given by staff: Instructions given on phone and pt verbalized understanding

## 2025-02-02 ENCOUNTER — HOSPITAL ENCOUNTER (OUTPATIENT)
Dept: ULTRASOUND IMAGING | Facility: HOSPITAL | Age: 65
Discharge: HOME | End: 2025-02-02
Attending: INTERNAL MEDICINE
Payer: MEDICARE

## 2025-02-02 ENCOUNTER — HOSPITAL ENCOUNTER (OUTPATIENT)
Dept: ULTRASOUND IMAGING | Facility: HOSPITAL | Age: 65
End: 2025-02-02
Attending: INTERNAL MEDICINE
Payer: MEDICARE

## 2025-02-02 DIAGNOSIS — R10.10 PAIN OF UPPER ABDOMEN: ICD-10-CM

## 2025-02-02 PROCEDURE — 76700 US EXAM ABDOM COMPLETE: CPT | Performed by: INTERNAL MEDICINE

## 2025-02-06 ENCOUNTER — TELEPHONE (OUTPATIENT)
Facility: CLINIC | Age: 65
End: 2025-02-06

## 2025-02-06 NOTE — TELEPHONE ENCOUNTER
----- Message from Keo Aponte sent at 2/6/2025  2:15 PM CST -----  Maya,   Your ultrasound looked ok, liver and gallbladder look normal.  Small benign cyst noted on your left kidney.    Dr. Aponte

## 2025-02-06 NOTE — TELEPHONE ENCOUNTER
Patient viewed below result note in MyChart:  Seen by patient Maya KNIGHT Pritesh on 2/6/2025  2:42 PM

## 2025-03-12 ENCOUNTER — TELEPHONE (OUTPATIENT)
Dept: INTERNAL MEDICINE CLINIC | Facility: CLINIC | Age: 65
End: 2025-03-12

## 2025-03-12 DIAGNOSIS — E78.2 MIXED HYPERLIPIDEMIA: Primary | ICD-10-CM

## 2025-03-12 DIAGNOSIS — N39.0 FREQUENT UTI: ICD-10-CM

## 2025-03-17 NOTE — TELEPHONE ENCOUNTER
All her labs look good and her fasting blood sug was good 6 mmns ago as well   She ca n recheck choleserolif she would like - I have ordered this for her   
Called the patient to give her urology referral information.     The patient states she stopped taking her Macrobid because she had a bad reaction to it so the urgent care doctor was calling ciprofloxacin in for her but the [facility] to is our of stock so she is waiting for it to come in. Informed her she could call other [facility] or pharmacies around her to see if it is in stock there and then call the prescribing doctor to have it send there instead. She verbalized understanding.   
Dr. Fernandez: patient is asking if there is any other lab testing you can order?  Patient plans to do lab workup from Dr. Muller next week.     Patient would like to know why she is getting frequent UTI's.     Today patient went to [place] urgent care due to urinary symptoms. The facility sent urine studies to [facility]. In meantime, they prescribed macrobid 100mg  for 7 days; until results comeback.     Patient states she has History of frequent UTI.    Lately, Patient states she has been craving for sugars, soda.     Would hemoglobin A1c be recommended?   
I called and notified patient of below - states understanding, will likely have lipid drawn with other labs ordered.     She did have urine done yesterday at outside facility urgent care, taking ABT and waiting for culture results to come back  She will share those with us when in.    Has had 4 UTI's if this one positive in last year. Has not seen urology before.   Something she should consider or do you have recommendations?       
Signed pended referral  
Assistance with ambulation/Assistance OOB with selected safe patient handling equipment/Communicate Risk of Fall with Harm to all staff/Discuss with provider need for PT consult/Monitor gait and stability/Provide patient with walking aids - walker, cane, crutches/Reinforce activity limits and safety measures with patient and family/Tailored Fall Risk Interventions/Visual Cue: Yellow wristband and red socks/Bed in lowest position, wheels locked, appropriate side rails in place/Call bell, personal items and telephone in reach/Instruct patient to call for assistance before getting out of bed or chair/Non-slip footwear when patient is out of bed/[place] to call system/Physically safe environment - no spills, clutter or unnecessary equipment/Purposeful Proactive Rounding/Room/bathroom lighting operational, light cord in reach

## 2025-03-21 ENCOUNTER — LAB ENCOUNTER (OUTPATIENT)
Dept: LAB | Facility: HOSPITAL | Age: 65
End: 2025-03-21
Attending: INTERNAL MEDICINE
Payer: MEDICARE

## 2025-03-21 DIAGNOSIS — M81.0 AGE-RELATED OSTEOPOROSIS WITHOUT CURRENT PATHOLOGICAL FRACTURE: ICD-10-CM

## 2025-03-21 DIAGNOSIS — E55.9 VITAMIN D DEFICIENCY: ICD-10-CM

## 2025-03-21 LAB
ALBUMIN SERPL-MCNC: 4.9 G/DL (ref 3.2–4.8)
ALBUMIN/GLOB SERPL: 1.8 {RATIO} (ref 1–2)
ALP LIVER SERPL-CCNC: 65 U/L
ALT SERPL-CCNC: 8 U/L
ANION GAP SERPL CALC-SCNC: 8 MMOL/L (ref 0–18)
AST SERPL-CCNC: 19 U/L (ref ?–34)
BILIRUB SERPL-MCNC: 0.7 MG/DL (ref 0.2–1.1)
BUN BLD-MCNC: 12 MG/DL (ref 9–23)
BUN/CREAT SERPL: 15.4 (ref 10–20)
CALCIUM BLD-MCNC: 9.5 MG/DL (ref 8.7–10.4)
CHLORIDE SERPL-SCNC: 104 MMOL/L (ref 98–112)
CHOLEST SERPL-MCNC: 244 MG/DL (ref ?–200)
CO2 SERPL-SCNC: 26 MMOL/L (ref 21–32)
CREAT BLD-MCNC: 0.78 MG/DL
EGFRCR SERPLBLD CKD-EPI 2021: 84 ML/MIN/1.73M2 (ref 60–?)
FASTING PATIENT LIPID ANSWER: YES
FASTING STATUS PATIENT QL REPORTED: YES
GLOBULIN PLAS-MCNC: 2.7 G/DL (ref 2–3.5)
GLUCOSE BLD-MCNC: 85 MG/DL (ref 70–99)
HDLC SERPL-MCNC: 96 MG/DL (ref 40–59)
LDLC SERPL CALC-MCNC: 127 MG/DL (ref ?–100)
NONHDLC SERPL-MCNC: 148 MG/DL (ref ?–130)
OSMOLALITY SERPL CALC.SUM OF ELEC: 285 MOSM/KG (ref 275–295)
POTASSIUM SERPL-SCNC: 4 MMOL/L (ref 3.5–5.1)
PROT SERPL-MCNC: 7.6 G/DL (ref 5.7–8.2)
PTH-INTACT SERPL-MCNC: 92.2 PG/ML (ref 18.5–88)
SODIUM SERPL-SCNC: 138 MMOL/L (ref 136–145)
TRIGL SERPL-MCNC: 126 MG/DL (ref 30–149)
VIT D+METAB SERPL-MCNC: 41 NG/ML (ref 30–100)
VLDLC SERPL CALC-MCNC: 22 MG/DL (ref 0–30)

## 2025-03-21 PROCEDURE — 82306 VITAMIN D 25 HYDROXY: CPT

## 2025-03-21 PROCEDURE — 83970 ASSAY OF PARATHORMONE: CPT

## 2025-03-21 PROCEDURE — 80053 COMPREHEN METABOLIC PANEL: CPT

## 2025-03-21 PROCEDURE — 36415 COLL VENOUS BLD VENIPUNCTURE: CPT | Performed by: INTERNAL MEDICINE

## 2025-03-21 PROCEDURE — 80061 LIPID PANEL: CPT | Performed by: INTERNAL MEDICINE

## 2025-03-21 PROCEDURE — 84080 ASSAY ALKALINE PHOSPHATASES: CPT

## 2025-03-24 ENCOUNTER — OFFICE VISIT (OUTPATIENT)
Dept: ENDOCRINOLOGY CLINIC | Facility: CLINIC | Age: 65
End: 2025-03-24
Payer: MEDICARE

## 2025-03-24 VITALS
DIASTOLIC BLOOD PRESSURE: 65 MMHG | BODY MASS INDEX: 19.44 KG/M2 | WEIGHT: 121 LBS | HEART RATE: 67 BPM | SYSTOLIC BLOOD PRESSURE: 102 MMHG | HEIGHT: 66 IN

## 2025-03-24 DIAGNOSIS — E55.9 VITAMIN D DEFICIENCY: ICD-10-CM

## 2025-03-24 DIAGNOSIS — M81.0 AGE-RELATED OSTEOPOROSIS WITHOUT CURRENT PATHOLOGICAL FRACTURE: Primary | ICD-10-CM

## 2025-03-24 NOTE — PROGRESS NOTES
FU VISIT:     CHIEF COMPLAINT:    Chief Complaint   Patient presents with    Follow - Up     Prolia injection        HISTORY OF PRESENT ILLNESS:   Maya Jonas is a 64 year old female who presents Osteoporosis.      Diagnosed on routine DXA after hysterectomy about 20 years ago    She has been on fosamax and reclast in the past  Received Prolia, first injection received Aug 2019. Next dose 2020, Sep 2020, 2021. She did not get any further injections, can not recall why  Started evenity Aug 2022- Aug 2023, prolia in Sep 2023, 2024, Sep 2024    Fractures:   2022 : rib fractures. She bent to  something and developed pain, x rays confirmed rib fractures    CT 2022:   Moderate T6 and mild-to-moderate T7 vertebral compression fracture.  Minimal T8 superior endplate compression fracture.      Also noted h/o patellar fracture on Epic      No fall, no fractures       Calcium and Vit d intake: vit D 3000 units daily  Steroid use,  PPI use, chronic pain medication use, Anti-epileptics use: denies  RA: no  Exercise: no  She is postmenopausal  Maternal history of osteoporosis Yes  History of fractures: denies  History of kidney stones No     Smoking No   Alcohol No  History of thyroid disease No   History of calcium problems: No   History of Malabsorption: No     No h/o radiation  No extensive dental work planned in the future  + acid reflux, on medication     Doing well.   Recent fall, slipped , was running -->  no fractures     PAST MEDICAL HISTORY:   Past Medical History:    Arthritis    Fibromyalgia    Osteoarthritis    Osteopenia       PAST SURGICAL HISTORY:   Past Surgical History:   Procedure Laterality Date    Colonoscopy      Hysterectomy            Other surgical history  2024    Local excision of left lower lip lesion with primary closure.    Tonsillectomy      Total abdom hysterectomy         CURRENT MEDICATIONS:    Current Outpatient Medications   Medication Sig Dispense  Refill    Na Sulfate-K Sulfate-Mg Sulf (SUPREP BOWEL PREP KIT) 17.5-3.13-1.6 GM/177ML Oral Solution Take as directed 1 each 0    cyanocobalamine 1000 MCG Oral Tab Take 1 tablet (1,000 mcg total) by mouth daily.      Vitamin D3 50 MCG (2000 UT) Oral Cap Take 1 capsule (2,000 Units total) by mouth daily. 30 capsule 3    Acetaminophen (TYLENOL OR) 500 mg as needed.        saccharomyces boulardii 250 MG Oral Cap Take 1 capsule (250 mg total) by mouth 2 (two) times daily. 60 capsule 0       ALLERGIES:  No Known Allergies    SOCIAL HISTORY:    Social History     Socioeconomic History    Marital status: Single   Tobacco Use    Smoking status: Never     Passive exposure: Never    Smokeless tobacco: Never   Vaping Use    Vaping status: Never Used   Substance and Sexual Activity    Alcohol use: No    Drug use: No   Other Topics Concern    Caffeine Concern Yes     Comment: coffee daily    Exercise No       FAMILY HISTORY:   Family History   Problem Relation Age of Onset    Other (Other) Mother         RA    Stroke Mother     Cancer Mother         Stomach    Prostate Cancer Father 80    Heart Disorder Father     Stroke Father     Cancer Father         Prostate    Prostate Cancer Brother 59    Cancer Brother        ASSESSMENTS:     REVIEW OF SYSTEMS:  Constitutional: Negative for: weight change, fever, fatigue, cold/heat intolerance  Eyes: Negative for:  Visual changes, proptosis, blurring  ENT: Negative for:  dysphagia, neck swelling, dysphonia  Respiratory: Negative for:  dyspnea, cough  Cardiovascular: Negative for:  chest pain, palpitations, orthopnea  GI: Negative for:  abdominal pain, nausea, vomiting, diarrhea, constipation, bleeding  Neurology: Negative for: headache, numbness, weakness  Genito-Urinary: Negative for: dysuria, frequency  Psychiatric: Negative for:  depression, anxiety  Hematology/Lymphatics: Negative for: bruising, lower extremity edema  Endocrine: Negative for: polyuria, polydypsia  Skin: Negative for:  rash, blister, cellulitis      PHYSICAL EXAM:   Vitals:    03/24/25 0844   BP: 102/65   Pulse: 67   Weight: 121 lb (54.9 kg)   Height: 5' 6\" (1.676 m)     BMI: Body mass index is 19.53 kg/m².       General Appearance:  alert, well developed, in no acute distress  Head: Atraumatic  Eyes:  normal conjunctivae, sclera., normal sclera and normal pupils  Throat/Neck: normal sound to voice. Normal hearing, normal speech  Respiratory:  Speaking in full sentences, non-labored. no increased work of breathing, no audible wheezing    Neuro: motor grossly intact, moving all extremities without difficulty  Psychiatric:  oriented to time, self, and place  Extremities: no obvious extremity swelling, no lesions        DATA:     Pertinent data reviewed    DXA April 2024: shows improvement in BMD      ASSESSMENT AND PLAN:    Patient is a 64 year old female with:      Osteoporosis with h/o fragility fractures.   Discussed Osteoporosis is detail including pathogenesis, risk factors and treatment options.    Given that she ha trid anti resporptiv treatments in the past and also ha a h/o fragility fractures, hence she was started on evenity  Aug [id number], prolia in Sep 2023, march 2024, Sep 2024    - Labs  Reviewed  - Prolia 4th injection of this cycle today    - Vit ID is normal 3000 units daily  - Discussed dietary intake of calcium  - Resistance exercises  - Fall precautions.     2. Vitamin D deficiency     - Vit ID is normal, on 3000 units daily   CPM    3. High PTH  Calcium is normal  I reviewed the following:  PTH-calcium axis  She will hydrate well since increased serum calcium can  increase risk of kidney stones.   I have reviewed the implications of elevated PTH which include worsening of bones       RTC in 6 months with me.     Shanna Muller MD        Patient verbalized a complete  understanding of all of the above and did not have any further questions.    Labs: Calcium, vitamin D, parathyroid hormone

## 2025-03-25 LAB — ALKALINE PHOSPHATASE BONE SPECIFIC: 8.4 UG/L

## 2025-05-06 ENCOUNTER — TELEPHONE (OUTPATIENT)
Facility: CLINIC | Age: 65
End: 2025-05-06

## 2025-05-06 DIAGNOSIS — Z12.11 SPECIAL SCREENING FOR MALIGNANT NEOPLASMS, COLON: Primary | ICD-10-CM

## 2025-05-06 NOTE — TELEPHONE ENCOUNTER
CANCELLED for: colonoscopy [id number]    Provider Name: Dr. Aponte  Date: Thurs 5/22/2025   Location: [facility]   Sedation: MAC    Time: 12:30 pm,           Cancelled in epic and endo

## 2025-08-16 ENCOUNTER — HOSPITAL ENCOUNTER (OUTPATIENT)
Dept: MAMMOGRAPHY | Facility: HOSPITAL | Age: 65
Discharge: HOME | End: 2025-08-16
Attending: INTERNAL MEDICINE

## 2025-08-16 DIAGNOSIS — Z12.31 SCREENING MAMMOGRAM, ENCOUNTER FOR: ICD-10-CM

## 2025-08-16 PROCEDURE — 77063 BREAST TOMOSYNTHESIS BI: CPT | Performed by: INTERNAL MEDICINE

## 2025-08-16 PROCEDURE — 77067 SCR MAMMO BI INCL CAD: CPT | Performed by: INTERNAL MEDICINE

## (undated) DEVICE — SUT VCRL 4-0 18IN ABSRB UD L13MM P-3 3/8

## (undated) DEVICE — PACK CDS HEAD

## (undated) DEVICE — SUCTION CANISTER, 3000CC,SAFELINER: Brand: DEROYAL

## (undated) DEVICE — CABLE BPLR L12FT FLYING LD DISP

## (undated) DEVICE — SOLUTION IRRIG 1000ML 0.9% NACL USP BTL

## (undated) DEVICE — GLOVE SUR 6 SENSICARE PI MIC PIP CRM PWD F

## (undated) DEVICE — 3 ML SYRINGE LUER-LOCK TIP: Brand: MONOJECT

## (undated) NOTE — LETTER
10/03/19        [address] [place] [id number]      Dear Karen Rodriguez records indicate that you have outstanding lab work and or testing that was ordered for you and has not yet been completed:  Orders Placed This Encounter

## (undated) NOTE — Clinical Note
Thank you for the consult. I saw Ms. Fuentes Crimes in  the endocrine/diabetes clinic today. Please see attached my note. Please feel free to contact me with any questions. Thanks!

## (undated) NOTE — LETTER
10/14/2023              [address]        [place] [address]         To whom it may concern,    Sara Welch ([address] number [id number] )is currently a patient under my medical care. The patient's medical condition makes serving on jury duty inadvisable at this time. Please excuse them from serving. If you require additional information please do not hesitate to contact my office.         Sincerely,    Faizan Vick MD  [facility], [place] [address] [phone]        Document electronically generated by:  Faizan Vick MD

## (undated) NOTE — LETTER
07/19/19        [address] [facility] [id number]      Dear Laura Bhandari records indicate that you have outstanding lab work and or testing that was ordered for you and has not yet been completed:  Orders Placed This Encounter

## (undated) NOTE — LETTER
8/27/2020              Ishan Gibbs        [address] [address]         To whom it may concern,    Ishan Gibbs is currently a patient under my medical care.   The patient is working at a medical facility with the elderly and

## (undated) NOTE — LETTER
04/25/19        [facility] [id number]      Dear Jaylen Melton records indicate that you have outstanding lab work and or testing that was ordered for you and has not yet been completed:  Orders Placed This Encounter      Comp